# Patient Record
Sex: FEMALE | Race: WHITE | NOT HISPANIC OR LATINO | Employment: OTHER | RURAL
[De-identification: names, ages, dates, MRNs, and addresses within clinical notes are randomized per-mention and may not be internally consistent; named-entity substitution may affect disease eponyms.]

---

## 2022-04-01 ENCOUNTER — TELEPHONE (OUTPATIENT)
Dept: FAMILY MEDICINE CLINIC | Facility: CLINIC | Age: 82
End: 2022-04-01

## 2022-04-18 ENCOUNTER — OFFICE VISIT (OUTPATIENT)
Dept: FAMILY MEDICINE CLINIC | Facility: CLINIC | Age: 82
End: 2022-04-18

## 2022-04-18 VITALS
SYSTOLIC BLOOD PRESSURE: 124 MMHG | HEIGHT: 65 IN | DIASTOLIC BLOOD PRESSURE: 72 MMHG | BODY MASS INDEX: 26.66 KG/M2 | WEIGHT: 160 LBS

## 2022-04-18 DIAGNOSIS — K62.5 RECTAL BLEEDING: Primary | ICD-10-CM

## 2022-04-18 DIAGNOSIS — M54.50 CHRONIC LOW BACK PAIN, UNSPECIFIED BACK PAIN LATERALITY, UNSPECIFIED WHETHER SCIATICA PRESENT: ICD-10-CM

## 2022-04-18 DIAGNOSIS — G89.29 CHRONIC LOW BACK PAIN, UNSPECIFIED BACK PAIN LATERALITY, UNSPECIFIED WHETHER SCIATICA PRESENT: ICD-10-CM

## 2022-04-18 DIAGNOSIS — N39.0 CHRONIC UTI: ICD-10-CM

## 2022-04-18 PROCEDURE — 36415 COLL VENOUS BLD VENIPUNCTURE: CPT | Performed by: STUDENT IN AN ORGANIZED HEALTH CARE EDUCATION/TRAINING PROGRAM

## 2022-04-18 PROCEDURE — 99214 OFFICE O/P EST MOD 30 MIN: CPT | Performed by: STUDENT IN AN ORGANIZED HEALTH CARE EDUCATION/TRAINING PROGRAM

## 2022-04-18 RX ORDER — PRAVASTATIN SODIUM 80 MG/1
80 TABLET ORAL DAILY
COMMUNITY
Start: 2022-03-31 | End: 2022-08-11 | Stop reason: SDUPTHER

## 2022-04-18 RX ORDER — ESCITALOPRAM OXALATE 10 MG/1
10 TABLET ORAL DAILY
COMMUNITY
Start: 2022-03-31 | End: 2022-08-11 | Stop reason: SDUPTHER

## 2022-04-18 RX ORDER — BRIMONIDINE TARTRATE/TIMOLOL 0.2%-0.5%
DROPS OPHTHALMIC (EYE)
COMMUNITY
Start: 2022-03-23

## 2022-04-18 RX ORDER — PIOGLITAZONEHYDROCHLORIDE 30 MG/1
30 TABLET ORAL DAILY
Qty: 90 TABLET | Refills: 1 | Status: SHIPPED | OUTPATIENT
Start: 2022-04-18 | End: 2022-08-11 | Stop reason: SDUPTHER

## 2022-04-18 RX ORDER — GLIMEPIRIDE 2 MG/1
2 TABLET ORAL DAILY
COMMUNITY
End: 2022-07-06

## 2022-04-18 RX ORDER — METOPROLOL SUCCINATE 50 MG/1
50 TABLET, EXTENDED RELEASE ORAL DAILY
COMMUNITY
Start: 2022-03-31 | End: 2022-08-11 | Stop reason: SDUPTHER

## 2022-04-18 RX ORDER — PANTOPRAZOLE SODIUM 40 MG/1
40 TABLET, DELAYED RELEASE ORAL DAILY
COMMUNITY
Start: 2022-03-31 | End: 2022-08-11 | Stop reason: SDUPTHER

## 2022-04-18 RX ORDER — PIOGLITAZONEHYDROCHLORIDE 30 MG/1
30 TABLET ORAL DAILY
COMMUNITY
End: 2022-04-18 | Stop reason: SDUPTHER

## 2022-04-18 RX ORDER — VALSARTAN 160 MG/1
160 TABLET ORAL DAILY
COMMUNITY
Start: 2022-03-31 | End: 2022-08-11 | Stop reason: SDUPTHER

## 2022-04-18 RX ORDER — CEPHALEXIN 250 MG/1
250 CAPSULE ORAL NIGHTLY
COMMUNITY
Start: 2022-04-08 | End: 2022-09-14 | Stop reason: SDUPTHER

## 2022-04-18 RX ORDER — LATANOPROST 50 UG/ML
SOLUTION/ DROPS OPHTHALMIC
COMMUNITY
Start: 2022-03-23

## 2022-04-18 NOTE — ASSESSMENT & PLAN NOTE
Last colonoscopy recommended follow-up in 5 years in 2016.  She is unable to quantify the amount of rectal bleeding.  We will do CBC today to look for acute anemia, and I will send referral to GI.

## 2022-04-18 NOTE — ASSESSMENT & PLAN NOTE
Discussed at length that since she is not symptomatic at this time we do not need to do a UA.  Advised that if she does have any symptoms such as dysuria, urinary frequency or hesitancy or there is blood seen in the urine that we can do a UA and culture.

## 2022-04-18 NOTE — PROGRESS NOTES
"Chief Complaint  Med Refill and Rectal Bleeding    Subjective          Jennyfer Portillo presents to [facility]  History of Present Illness    Rectal bleeding: She states that it has been going on off and on for about a year. She states that it has improved some.  She is unable to quantify the amount of blood that is in her stool. Last Colonoscopy was 2016. She has a hx oif both internal hemorrhoids and sigmoid diverticulosis.     Chronic UTI: Daughter states that she has been on Keflex as maintenance medication daily for several years. She is not having any symptoms of UTI at this time including no Burning, urinary frequency, worsening incontinence, has not had any change in her cognition.    Low back pain: post fall several months ago. She states that she had an Xray which showed some arthritis. She has not been to PT, but she has been recommended to see Pain management, and she declined at that time.     Objective   Vital Signs:   /72 (BP Location: Left arm, Patient Position: Sitting, Cuff Size: Adult)   Ht 165.1 cm (65\")   Wt 72.6 kg (160 lb)   BMI 26.63 kg/m²     Body mass index is 26.63 kg/m².    Review of Systems    Past History:  Medical History: has a past medical history of Benign essential hypertension, Bilateral hearing loss, Carotid artery disease (HCC) (1998), Chronic obstructive lung disease (HCC), Depressive disorder, Diverticulosis (01/14/2016), Glaucoma, High risk medication use, myocardial infarction, Megaloblastic anemia, Migraines, Mixed hyperlipidemia, Obesity, Osteopenia, Pregnancy, Recurrent UTI (urinary tract infection), Type 2 diabetes mellitus (HCC), and Vitamin D deficiency.   Surgical History: has a past surgical history that includes ORIF ankle fracture (Right, 2005); Retinal detachment surgery (06/04/2009); Colonoscopy (11/20/2012); Hysterectomy (1979); and Essure tubal ligation (1968).   Family History: family history includes Colon cancer in her " mother and sister; Coronary artery disease in her mother; Diabetes in her mother; Hyperlipidemia in her mother; Hypertension in her mother; Peripheral vascular disease in her mother.   Social History: reports that she has quit smoking. She has never used smokeless tobacco. Alcohol use questions deferred to the physician. Drug use questions deferred to the physician.      Current Outpatient Medications:   •  cephalexin (KEFLEX) 250 MG capsule, , Disp: , Rfl:   •  Combigan 0.2-0.5 % ophthalmic solution, , Disp: , Rfl:   •  escitalopram (LEXAPRO) 10 MG tablet, , Disp: , Rfl:   •  glimepiride (AMARYL) 2 MG tablet, Take 2 mg by mouth Daily., Disp: , Rfl:   •  latanoprost (XALATAN) 0.005 % ophthalmic solution, , Disp: , Rfl:   •  metFORMIN (GLUCOPHAGE) 500 MG tablet, Take 500 mg by mouth., Disp: , Rfl:   •  metoprolol succinate XL (TOPROL-XL) 50 MG 24 hr tablet, , Disp: , Rfl:   •  pantoprazole (PROTONIX) 40 MG EC tablet, , Disp: , Rfl:   •  pioglitazone (ACTOS) 30 MG tablet, Take 1 tablet by mouth Daily., Disp: 90 tablet, Rfl: 1  •  pravastatin (PRAVACHOL) 80 MG tablet, , Disp: , Rfl:   •  valsartan (DIOVAN) 160 MG tablet, , Disp: , Rfl:     Allergies: Sulfa antibiotics    Physical Exam  Constitutional:       General: She is not in acute distress.     Appearance: Normal appearance. She is not toxic-appearing.   HENT:      Head: Normocephalic and atraumatic.   Cardiovascular:      Rate and Rhythm: Normal rate and regular rhythm.   Pulmonary:      Effort: Pulmonary effort is normal.      Breath sounds: Normal breath sounds. No wheezing.   Abdominal:      General: Abdomen is flat. There is no distension.      Tenderness: There is no abdominal tenderness. There is no right CVA tenderness, left CVA tenderness or guarding.   Skin:     General: Skin is warm and dry.      Coloration: Skin is pale.   Neurological:      Mental Status: She is alert.          Result Review :                   Assessment and Plan    Diagnoses and all  orders for this visit:    1. Rectal bleeding (Primary)  Assessment & Plan:  Last colonoscopy recommended follow-up in 5 years in 2016.  She is unable to quantify the amount of rectal bleeding.  We will do CBC today to look for acute anemia, and I will send referral to GI.     Orders:  -     pioglitazone (ACTOS) 30 MG tablet; Take 1 tablet by mouth Daily.  Dispense: 90 tablet; Refill: 1  -     Ambulatory Referral to Gastroenterology  -     CBC & Differential    2. Chronic UTI  Assessment & Plan:  Discussed at length that since she is not symptomatic at this time we do not need to do a UA.  Advised that if she does have any symptoms such as dysuria, urinary frequency or hesitancy or there is blood seen in the urine that we can do a UA and culture.        3. Chronic low back pain, unspecified back pain laterality, unspecified whether sciatica present  Assessment & Plan:  Offered PT.  Patient declined.  Advised the use of Tylenol, or occasional ibuprofen to help with pain as well as heat and stretching.        Follow Up   No follow-ups on file.  Patient was given instructions and counseling regarding her condition or for health maintenance advice. Please see specific information pulled into the AVS if appropriate.     Christina Abad, DO

## 2022-04-18 NOTE — ASSESSMENT & PLAN NOTE
Offered PT.  Patient declined.  Advised the use of Tylenol, or occasional ibuprofen to help with pain as well as heat and stretching.

## 2022-04-19 LAB
BASOPHILS # BLD AUTO: 0 X10E3/UL (ref 0–0.2)
BASOPHILS NFR BLD AUTO: 1 %
EOSINOPHIL # BLD AUTO: 0.2 X10E3/UL (ref 0–0.4)
EOSINOPHIL NFR BLD AUTO: 3 %
ERYTHROCYTE [DISTWIDTH] IN BLOOD BY AUTOMATED COUNT: 13.3 % (ref 11.7–15.4)
HCT VFR BLD AUTO: 32.2 % (ref 34–46.6)
HGB BLD-MCNC: 10.3 G/DL (ref 11.1–15.9)
IMM GRANULOCYTES # BLD AUTO: 0 X10E3/UL (ref 0–0.1)
IMM GRANULOCYTES NFR BLD AUTO: 0 %
LYMPHOCYTES # BLD AUTO: 0.9 X10E3/UL (ref 0.7–3.1)
LYMPHOCYTES NFR BLD AUTO: 14 %
MCH RBC QN AUTO: 31 PG (ref 26.6–33)
MCHC RBC AUTO-ENTMCNC: 32 G/DL (ref 31.5–35.7)
MCV RBC AUTO: 97 FL (ref 79–97)
MONOCYTES # BLD AUTO: 0.4 X10E3/UL (ref 0.1–0.9)
MONOCYTES NFR BLD AUTO: 7 %
NEUTROPHILS # BLD AUTO: 4.7 X10E3/UL (ref 1.4–7)
NEUTROPHILS NFR BLD AUTO: 75 %
PLATELET # BLD AUTO: 193 X10E3/UL (ref 150–450)
RBC # BLD AUTO: 3.32 X10E6/UL (ref 3.77–5.28)
WBC # BLD AUTO: 6.3 X10E3/UL (ref 3.4–10.8)

## 2022-04-25 PROBLEM — R63.4 WEIGHT LOSS: Status: ACTIVE | Noted: 2020-03-04

## 2022-04-25 PROBLEM — R93.2 ABNORMAL CT SCAN, GALLBLADDER: Status: ACTIVE | Noted: 2020-03-04

## 2022-04-25 PROBLEM — E11.9 DIABETES MELLITUS: Status: ACTIVE | Noted: 2020-03-04

## 2022-04-25 PROBLEM — C24.9: Status: ACTIVE | Noted: 2020-03-04

## 2022-04-25 PROBLEM — R97.0 ELEVATED CEA: Status: ACTIVE | Noted: 2020-03-09

## 2022-04-25 PROBLEM — K80.50 STONES COMMON DUCT: Status: ACTIVE | Noted: 2020-03-04

## 2022-04-25 PROBLEM — R79.89 ELEVATED LFTS: Status: ACTIVE | Noted: 2020-03-04

## 2022-04-25 PROBLEM — R10.11 RIGHT UPPER QUADRANT ABDOMINAL PAIN: Status: ACTIVE | Noted: 2020-03-04

## 2022-04-25 PROBLEM — I10 HYPERTENSION: Status: ACTIVE | Noted: 2020-03-04

## 2022-04-28 ENCOUNTER — OFFICE VISIT (OUTPATIENT)
Dept: FAMILY MEDICINE CLINIC | Facility: CLINIC | Age: 82
End: 2022-04-28

## 2022-04-28 VITALS
RESPIRATION RATE: 18 BRPM | SYSTOLIC BLOOD PRESSURE: 142 MMHG | BODY MASS INDEX: 27.71 KG/M2 | WEIGHT: 166.3 LBS | HEIGHT: 65 IN | HEART RATE: 63 BPM | TEMPERATURE: 97.8 F | OXYGEN SATURATION: 99 % | DIASTOLIC BLOOD PRESSURE: 76 MMHG

## 2022-04-28 DIAGNOSIS — B95.62 CELLULITIS DUE TO MRSA: Primary | ICD-10-CM

## 2022-04-28 DIAGNOSIS — L03.90 CELLULITIS DUE TO MRSA: Primary | ICD-10-CM

## 2022-04-28 PROCEDURE — 99213 OFFICE O/P EST LOW 20 MIN: CPT | Performed by: PHYSICIAN ASSISTANT

## 2022-04-28 RX ORDER — DOXYCYCLINE HYCLATE 100 MG/1
100 CAPSULE ORAL 2 TIMES DAILY
Qty: 14 CAPSULE | Refills: 0 | Status: SHIPPED | OUTPATIENT
Start: 2022-04-28 | End: 2022-05-05

## 2022-04-28 NOTE — PROGRESS NOTES
"Chief Complaint  Cyst (Left arm, near shoulder)    Subjective          Jennyfer Portillo presents to [facility]  History of Present Illness   Patient presents to the clinic with her daughter.  She states that she has had a small red place on her arm for about a month.  She states she told her daughter about it about a week ago.  Daughter states that the spot has looked more red to her and has also had some drainage off and on.  They deny any fever patient states that it is tender.  She is currently on cephalexin daily for recurrent urinary tract infections      Objective   Vital Signs:   /76   Pulse 63   Temp 97.8 °F (36.6 °C)   Resp 18   Ht 165.1 cm (65\")   Wt 75.4 kg (166 lb 4.8 oz)   SpO2 99%   BMI 27.67 kg/m²     Physical Exam  Vitals reviewed.   Constitutional:       Appearance: Normal appearance.   HENT:      Head: Normocephalic.      Right Ear: Tympanic membrane, ear canal and external ear normal.      Left Ear: Tympanic membrane, ear canal and external ear normal.      Nose: Nose normal.      Mouth/Throat:      Mouth: Mucous membranes are moist.   Eyes:      Pupils: Pupils are equal, round, and reactive to light.   Cardiovascular:      Rate and Rhythm: Normal rate and regular rhythm.      Heart sounds: Normal heart sounds.   Pulmonary:      Effort: Pulmonary effort is normal.      Breath sounds: Normal breath sounds.   Skin:         Neurological:      Mental Status: She is alert.        Result Review :                 Assessment and Plan    Diagnoses and all orders for this visit:    1. Cellulitis due to MRSA (Primary)    Made an appointment with general surgery for tomorrow to have pustule lanced.  Will have patient hold her cephalexin and will add doxycycline twice a day for the her MRSA as she is allergic to sulfa  -     doxycycline (VIBRAMYCIN) 100 MG capsule; Take 1 capsule by mouth 2 (Two) Times a Day for 7 days.  Dispense: 14 capsule; Refill: 0  -     Ambulatory " Referral to General Surgery               Follow Up   No follow-ups on file.  Patient was given instructions and counseling regarding her condition or for health maintenance advice. Please see specific information pulled into the AVS if appropriate.

## 2022-05-16 ENCOUNTER — OFFICE VISIT (OUTPATIENT)
Dept: GASTROENTEROLOGY | Facility: CLINIC | Age: 82
End: 2022-05-16

## 2022-05-16 VITALS
TEMPERATURE: 97.1 F | BODY MASS INDEX: 27.26 KG/M2 | DIASTOLIC BLOOD PRESSURE: 68 MMHG | WEIGHT: 163.6 LBS | HEART RATE: 66 BPM | HEIGHT: 65 IN | SYSTOLIC BLOOD PRESSURE: 122 MMHG | OXYGEN SATURATION: 94 %

## 2022-05-16 DIAGNOSIS — R19.5 DARK STOOLS: ICD-10-CM

## 2022-05-16 DIAGNOSIS — D64.9 CHRONIC ANEMIA: Primary | ICD-10-CM

## 2022-05-16 DIAGNOSIS — K21.9 GASTROESOPHAGEAL REFLUX DISEASE, UNSPECIFIED WHETHER ESOPHAGITIS PRESENT: ICD-10-CM

## 2022-05-16 PROBLEM — E55.9 VITAMIN D DEFICIENCY: Status: ACTIVE | Noted: 2022-05-16

## 2022-05-16 PROBLEM — D53.1 MEGALOBLASTIC ANEMIA: Status: ACTIVE | Noted: 2022-05-16

## 2022-05-16 PROBLEM — Z79.899 HIGH RISK MEDICATION USE: Status: ACTIVE | Noted: 2022-05-16

## 2022-05-16 PROBLEM — F32.A DEPRESSIVE DISORDER: Status: ACTIVE | Noted: 2022-05-16

## 2022-05-16 PROBLEM — M85.80 OSTEOPENIA: Status: ACTIVE | Noted: 2022-05-16

## 2022-05-16 PROBLEM — H91.93 BILATERAL HEARING LOSS: Status: ACTIVE | Noted: 2022-05-16

## 2022-05-16 PROBLEM — I25.2 HISTORY OF MYOCARDIAL INFARCTION: Status: ACTIVE | Noted: 2022-05-16

## 2022-05-16 PROCEDURE — 99204 OFFICE O/P NEW MOD 45 MIN: CPT | Performed by: PHYSICIAN ASSISTANT

## 2022-05-16 RX ORDER — AMLODIPINE BESYLATE 5 MG/1
5 TABLET ORAL DAILY
COMMUNITY
Start: 2022-05-03 | End: 2023-02-08

## 2022-05-16 NOTE — PROGRESS NOTES
New Patient Consultation     Patient Name: Jennyfer Portillo  : 1940   MRN: [id number]     Chief Complaint:    Chief Complaint   Patient presents with   • Black or Bloody Stool       History of Present Illness: Jennyfer Portillo is a 82 y.o. female, PMH includes CAD, HTN, vitamin D defiency, chronic UTIs, depression, who is here today for a Gastroenterology Consultation for rectal bleeding.    PMH also significant for hospitalization at [facility] 3/2020 for elevated LFTs, choledocholithiasis. MRCP at that time reveals cholelithiasis and choledocholithiasis with associated inflammation of the gallbladder mucosa and mild pericholecystic fluid. There is mild dilation if intrahepatic biliary tree. Incidental note of pancreas divisum, no nodules or masses. She was noted to have elevated CEA at that time, normal CA 19-9. She did not follow up for CSY with [place] due to COVID-19 pandemic.     CBC  reveals Hb 10.3, Hct 32.2, plt 193. These values are consistent with pt's baseline dating back 2 years.     CSY 2016 with Dr. Amin. Adequate bowel prep conditions. Moderately severe diverticulosis in sigmoid colon. Internal hemorrhoids. Recommend repeat CSY in 5 years.     She notes loose, dark stools daily for nearly two years. She notes occasional bright red blood when wiping. She otherwise denies associated fever, chills, abdominal pain, indigestion, nausea, vomiting, constipation, hematemesis, dysphagia, hematochezia, bloating, weight loss or gain, dysuria, jaundice or bruising.    Patient denies personal or FHx of PUD, H Pylori, gastritis, pancreatitis, colitis, Celiac disease, UC, Crohn's disease, IBS, colon or gastric cancers. Pt denies EtOH, tobacco, illicit substance or NSAID use.    Subjective      Review of Systems:   Review of Systems   Constitutional: Negative for appetite change, chills, diaphoresis, fatigue, fever, unexpected weight gain and unexpected weight loss.   HENT: Negative for drooling,  facial swelling, mouth sores, rhinorrhea, sore throat, tinnitus, trouble swallowing and voice change.    Eyes: Negative.    Respiratory: Negative for cough, chest tightness and shortness of breath.    Cardiovascular: Negative for chest pain.   Gastrointestinal: Positive for anal bleeding. Negative for abdominal pain, blood in stool, constipation, diarrhea, nausea, rectal pain, vomiting, GERD and indigestion.   Genitourinary: Negative for dysuria, flank pain, hematuria and pelvic pain.   Musculoskeletal: Negative for arthralgias and myalgias.   Skin: Negative for color change, pallor and rash.   Neurological: Negative for dizziness, tremors, syncope, weakness and numbness.   Psychiatric/Behavioral: Negative for hallucinations and sleep disturbance. The patient is not nervous/anxious.    All other systems reviewed and are negative.      Past Medical History:   Past Medical History:   Diagnosis Date   • Benign essential hypertension    • Bilateral hearing loss    • Carotid artery disease (HCC) 1998    ANGIOPLASTY, ANOTHER STENT 2001   • Chronic obstructive lung disease (HCC)    • Depressive disorder    • Diverticulosis 01/14/2016    , IN SIGMOID COLON, INTERNAL HEMMORRHOIDS, REPEAT RECOMMENDED 5 YRSOD SEVERE   • Glaucoma    • High risk medication use    • Hx of myocardial infarction    • Megaloblastic anemia    • Migraines    • Mixed hyperlipidemia     DUE TO TYPE 2 DIABETES, W. HYPERGLYCEMIA   • Obesity    • Osteopenia     OF BOTH HIPS/ SPINE   • Pregnancy     1962,1961,1963,1958,1960   • Recurrent UTI (urinary tract infection)    • Type 2 diabetes mellitus (HCC)     STAGE 3 CHRONIC KIDNEY DISEASE, W/O LONG  TERM USE OF CURRENT INSULIN   • Vitamin D deficiency        Past Surgical History:   Past Surgical History:   Procedure Laterality Date   • COLONOSCOPY  11/20/2012    MODERATLEY SEVERE DIVERTICLOSIS FOUND IN THE SIGMOID COLON AND DESCENDING COLON. RECOMMENDED IN 3 YRS   • ESSURE TUBAL LIGATION  1968    BILATERAL    • HYSTERECTOMY     • ORIF ANKLE FRACTURE Right 2005    OPEN REDUCTION INTERNAL FIXATION, SUCESSFUL OUTCOME   • RETINAL DETACHMENT SURGERY  2009    REATTACHED , LEFT       Family History:   Family History   Problem Relation Age of Onset   • Coronary artery disease Mother    • Colon cancer Mother    • Diabetes Mother    • Hyperlipidemia Mother    • Hypertension Mother    • Peripheral vascular disease Mother    • Colon cancer Sister        Social History:   Social History     Socioeconomic History   • Marital status:    Tobacco Use   • Smoking status: Former Smoker     Packs/day: 0.25     Years: 5.00     Pack years: 1.25     Quit date:      Years since quittin.3   • Smokeless tobacco: Never Used   Vaping Use   • Vaping Use: Never used   Substance and Sexual Activity   • Alcohol use: Defer   • Drug use: Defer   • Sexual activity: Defer       Alcohol/Tobacco History:   Social History     Substance and Sexual Activity   Alcohol Use Defer     Social History     Tobacco Use   Smoking Status Former Smoker   • Packs/day: 0.25   • Years: 5.00   • Pack years: 1.25   • Quit date:    • Years since quittin.3   Smokeless Tobacco Never Used       Medications:     Current Outpatient Medications:   •  amLODIPine (NORVASC) 5 MG tablet, , Disp: , Rfl:   •  cephalexin (KEFLEX) 250 MG capsule, , Disp: , Rfl:   •  Combigan 0.2-0.5 % ophthalmic solution, , Disp: , Rfl:   •  escitalopram (LEXAPRO) 10 MG tablet, , Disp: , Rfl:   •  glimepiride (AMARYL) 2 MG tablet, Take 2 mg by mouth Daily., Disp: , Rfl:   •  latanoprost (XALATAN) 0.005 % ophthalmic solution, , Disp: , Rfl:   •  metFORMIN (GLUCOPHAGE) 500 MG tablet, Take 500 mg by mouth., Disp: , Rfl:   •  metoprolol succinate XL (TOPROL-XL) 50 MG 24 hr tablet, , Disp: , Rfl:   •  pantoprazole (PROTONIX) 40 MG EC tablet, , Disp: , Rfl:   •  pioglitazone (ACTOS) 30 MG tablet, Take 1 tablet by mouth Daily., Disp: 90 tablet, Rfl: 1  •  pravastatin (PRAVACHOL)  "80 MG tablet, , Disp: , Rfl:   •  valsartan (DIOVAN) 160 MG tablet, , Disp: , Rfl:     Allergies:   Allergies   Allergen Reactions   • Rosuvastatin Calcium Unknown - Low Severity   • Sulfa Antibiotics Unknown - Low Severity       Objective     Physical Exam:  Vital Signs:   Vitals:    05/16/22 1342   BP: 122/68   BP Location: Left arm   Patient Position: Sitting   Cuff Size: Adult   Pulse: 66   Temp: 97.1 °F (36.2 °C)   TempSrc: Temporal   SpO2: 94%   Weight: 74.2 kg (163 lb 9.6 oz)   Height: 165.1 cm (65\")     Body mass index is 27.22 kg/m².     Physical Exam  Vitals and nursing note reviewed.   Constitutional:       Appearance: Normal appearance. She is normal weight. She is not ill-appearing or diaphoretic.   HENT:      Head: Normocephalic and atraumatic.      Right Ear: External ear normal.      Left Ear: External ear normal.      Nose: Nose normal. No rhinorrhea.      Mouth/Throat:      Mouth: Mucous membranes are moist.      Pharynx: Oropharynx is clear. No posterior oropharyngeal erythema.   Eyes:      General:         Right eye: No discharge.         Left eye: No discharge.      Conjunctiva/sclera: Conjunctivae normal.      Pupils: Pupils are equal, round, and reactive to light.   Neck:      Thyroid: No thyromegaly.   Cardiovascular:      Rate and Rhythm: Normal rate and regular rhythm.      Pulses: Normal pulses.      Heart sounds: Normal heart sounds. No murmur heard.  Pulmonary:      Effort: Pulmonary effort is normal.      Breath sounds: Normal breath sounds. No wheezing.   Abdominal:      General: Abdomen is flat. Bowel sounds are normal. There is no distension.      Tenderness: There is no abdominal tenderness. There is no guarding.   Musculoskeletal:         General: No tenderness. Normal range of motion.      Cervical back: Normal range of motion and neck supple.      Right lower leg: No edema.      Left lower leg: No edema.   Skin:     General: Skin is warm and dry.      Capillary Refill: Capillary " refill takes less than 2 seconds.      Coloration: Skin is pale. Skin is not jaundiced.      Findings: No bruising, erythema or rash.   Neurological:      General: No focal deficit present.      Mental Status: She is oriented to person, place, and time.      Cranial Nerves: No cranial nerve deficit.   Psychiatric:         Mood and Affect: Mood normal.         Thought Content: Thought content normal.         Judgment: Judgment normal.         Assessment / Plan      Assessment/Plan:   There are no diagnoses linked to this encounter.     Chronic anemia  Dark stools  GERD   - continue protonix 40mg daily   - obtain CBC, iron profile, ferritin   - schedule for EGD / CSY   - follow up in clinic after completion of above studies   - call clinic at any time for questions or new / worsened sx    Follow Up:   Return in about 6 weeks (around 6/27/2022).    Plan of care reviewed with the patient at the conclusion of today's visit.  Education was provided regarding diagnosis, management, and any prescribed or recommended OTC medications.  Patient verbalized understanding of and agreement with management plan.     Time Statement:   Discussed plan of care in detail with patient today. Patient verbally understands and agrees. I have spent 45 minutes reviewing available diagnostics, obtaining history, examining the patient, developing a treatment plan, and educating the patient on disease process and plan of care.    Leida Monterroso PA-C   [facility]

## 2022-05-17 DIAGNOSIS — Z12.11 COLON CANCER SCREENING: Primary | ICD-10-CM

## 2022-05-17 RX ORDER — SODIUM, POTASSIUM,MAG SULFATES 17.5-3.13G
1 SOLUTION, RECONSTITUTED, ORAL ORAL TAKE AS DIRECTED
Qty: 354 ML | Refills: 0 | Status: SHIPPED | OUTPATIENT
Start: 2022-05-17 | End: 2022-08-11

## 2022-05-18 ENCOUNTER — OUTSIDE FACILITY SERVICE (OUTPATIENT)
Dept: GASTROENTEROLOGY | Facility: CLINIC | Age: 82
End: 2022-05-18

## 2022-05-18 PROCEDURE — 88305 TISSUE EXAM BY PATHOLOGIST: CPT | Performed by: INTERNAL MEDICINE

## 2022-05-18 PROCEDURE — 45385 COLONOSCOPY W/LESION REMOVAL: CPT | Performed by: INTERNAL MEDICINE

## 2022-05-19 ENCOUNTER — LAB REQUISITION (OUTPATIENT)
Dept: LAB | Facility: HOSPITAL | Age: 82
End: 2022-05-19

## 2022-05-19 DIAGNOSIS — K57.30 DIVERTICULOSIS OF LARGE INTESTINE WITHOUT PERFORATION OR ABSCESS WITHOUT BLEEDING: ICD-10-CM

## 2022-05-19 DIAGNOSIS — K12.0 RECURRENT ORAL APHTHAE: ICD-10-CM

## 2022-05-19 DIAGNOSIS — K64.8 OTHER HEMORRHOIDS: ICD-10-CM

## 2022-05-19 DIAGNOSIS — R19.5 OTHER FECAL ABNORMALITIES: ICD-10-CM

## 2022-05-19 DIAGNOSIS — Z80.0 FAMILY HISTORY OF MALIGNANT NEOPLASM OF DIGESTIVE ORGANS: ICD-10-CM

## 2022-05-19 DIAGNOSIS — K62.5 HEMORRHAGE OF ANUS AND RECTUM: ICD-10-CM

## 2022-05-20 LAB
CYTO UR: NORMAL
LAB AP CASE REPORT: NORMAL
LAB AP CLINICAL INFORMATION: NORMAL
PATH REPORT.FINAL DX SPEC: NORMAL
PATH REPORT.GROSS SPEC: NORMAL

## 2022-06-22 ENCOUNTER — OUTSIDE FACILITY SERVICE (OUTPATIENT)
Dept: GASTROENTEROLOGY | Facility: CLINIC | Age: 82
End: 2022-06-22

## 2022-06-22 PROCEDURE — 88305 TISSUE EXAM BY PATHOLOGIST: CPT | Performed by: INTERNAL MEDICINE

## 2022-06-22 PROCEDURE — 43239 EGD BIOPSY SINGLE/MULTIPLE: CPT | Performed by: INTERNAL MEDICINE

## 2022-06-23 ENCOUNTER — LAB REQUISITION (OUTPATIENT)
Dept: LAB | Facility: HOSPITAL | Age: 82
End: 2022-06-23

## 2022-06-23 DIAGNOSIS — K44.9 DIAPHRAGMATIC HERNIA WITHOUT OBSTRUCTION OR GANGRENE: ICD-10-CM

## 2022-06-23 DIAGNOSIS — K29.70 GASTRITIS, UNSPECIFIED, WITHOUT BLEEDING: ICD-10-CM

## 2022-06-23 DIAGNOSIS — D64.9 ANEMIA, UNSPECIFIED: ICD-10-CM

## 2022-06-23 DIAGNOSIS — K21.00 GASTRO-ESOPHAGEAL REFLUX DISEASE WITH ESOPHAGITIS, WITHOUT BLEEDING: ICD-10-CM

## 2022-07-06 RX ORDER — GLIMEPIRIDE 2 MG/1
TABLET ORAL
Qty: 270 TABLET | Refills: 0 | Status: SHIPPED | OUTPATIENT
Start: 2022-07-06 | End: 2022-08-11 | Stop reason: SDUPTHER

## 2022-07-11 ENCOUNTER — OFFICE VISIT (OUTPATIENT)
Dept: GASTROENTEROLOGY | Facility: CLINIC | Age: 82
End: 2022-07-11

## 2022-07-11 ENCOUNTER — LAB (OUTPATIENT)
Dept: FAMILY MEDICINE CLINIC | Facility: CLINIC | Age: 82
End: 2022-07-11

## 2022-07-11 VITALS
HEART RATE: 75 BPM | BODY MASS INDEX: 27.16 KG/M2 | WEIGHT: 163 LBS | SYSTOLIC BLOOD PRESSURE: 132 MMHG | DIASTOLIC BLOOD PRESSURE: 68 MMHG | OXYGEN SATURATION: 97 % | TEMPERATURE: 97.4 F | HEIGHT: 65 IN

## 2022-07-11 DIAGNOSIS — D64.9 CHRONIC ANEMIA: Primary | ICD-10-CM

## 2022-07-11 DIAGNOSIS — K21.9 GASTROESOPHAGEAL REFLUX DISEASE, UNSPECIFIED WHETHER ESOPHAGITIS PRESENT: ICD-10-CM

## 2022-07-11 DIAGNOSIS — D64.9 CHRONIC ANEMIA: ICD-10-CM

## 2022-07-11 DIAGNOSIS — D12.6 TUBULAR ADENOMA OF COLON: ICD-10-CM

## 2022-07-11 DIAGNOSIS — K57.90 DIVERTICULOSIS: ICD-10-CM

## 2022-07-11 PROCEDURE — 99214 OFFICE O/P EST MOD 30 MIN: CPT | Performed by: PHYSICIAN ASSISTANT

## 2022-07-11 PROCEDURE — 36415 COLL VENOUS BLD VENIPUNCTURE: CPT | Performed by: PHYSICIAN ASSISTANT

## 2022-07-11 NOTE — PROGRESS NOTES
"     Follow Up      Patient Name: Jennyfer Portillo  : 1940   MRN: [id number]     Chief Complaint:  No chief complaint on file.      History of Present Illness: Jennyefr Portillo is a 82 y.o. female who is here today for post procedure follow up. Daughter is with her for visit today.     EGD  with Dr. Johnson. Normal upper and middle third of esophagus. LA Grade A esophagitis. Small sliding HH. Mild gastritis in body and antrum of stomach. Normal duodenum.     CSY  with Dr. Johnson. Good colon prep conditions. 4mm polyp (sessile serrated adenoma) in cecum, resected and retrieved. Diverticulosis in sigmoid colon. Nonbleeding internal hemorrhoids    She did not obtain labs since last visit. She is taking pantoprazole 40mg daily, denies iron supplementation. She states that she \"feels good\" and denies associated fever, chills, abdominal pain, indigestion, nausea, vomiting, diarrhea, constipation, hematemesis, dysphagia, hematochezia, melena, bloating, weight loss or gain, dysuria, jaundice or bruising.    Subjective      Review of Systems:   Review of Systems   Constitutional: Negative for appetite change, chills, diaphoresis, fatigue, fever, unexpected weight gain and unexpected weight loss.   HENT: Negative for drooling, facial swelling, mouth sores, rhinorrhea, sore throat, tinnitus, trouble swallowing and voice change.    Eyes: Negative.    Respiratory: Negative for cough, chest tightness and shortness of breath.    Cardiovascular: Negative for chest pain.   Gastrointestinal: Negative for abdominal pain, blood in stool, constipation, diarrhea, nausea, vomiting, GERD and indigestion.   Genitourinary: Negative for dysuria, flank pain, hematuria and pelvic pain.   Musculoskeletal: Negative for arthralgias and myalgias.   Skin: Negative for color change, pallor and rash.   Neurological: Negative for dizziness, tremors, syncope, weakness and numbness.   Psychiatric/Behavioral: Negative for hallucinations and " sleep disturbance. The patient is not nervous/anxious.    All other systems reviewed and are negative.      Medications:     Current Outpatient Medications:   •  amLODIPine (NORVASC) 5 MG tablet, , Disp: , Rfl:   •  cephalexin (KEFLEX) 250 MG capsule, , Disp: , Rfl:   •  Combigan 0.2-0.5 % ophthalmic solution, , Disp: , Rfl:   •  escitalopram (LEXAPRO) 10 MG tablet, , Disp: , Rfl:   •  glimepiride (AMARYL) 2 MG tablet, TAKE 1-3 TABLETS BY MOUTH EVERY DAY, Disp: 270 tablet, Rfl: 0  •  latanoprost (XALATAN) 0.005 % ophthalmic solution, , Disp: , Rfl:   •  metFORMIN (GLUCOPHAGE) 500 MG tablet, TAKE 2 TABLETS BY MOUTH TWICE DAILY, Disp: 360 tablet, Rfl: 0  •  metoprolol succinate XL (TOPROL-XL) 50 MG 24 hr tablet, , Disp: , Rfl:   •  pantoprazole (PROTONIX) 40 MG EC tablet, , Disp: , Rfl:   •  pioglitazone (ACTOS) 30 MG tablet, Take 1 tablet by mouth Daily., Disp: 90 tablet, Rfl: 1  •  pravastatin (PRAVACHOL) 80 MG tablet, , Disp: , Rfl:   •  sodium-potassium-magnesium sulfates (Suprep Bowel Prep Kit) 17.5-3.13-1.6 GM/177ML solution oral solution, Take 1 bottle by mouth Take As Directed. Follow instructions that were mailed to your home. If you didn't receive these call [phone]., Disp: 354 mL, Rfl: 0  •  valsartan (DIOVAN) 160 MG tablet, , Disp: , Rfl:     Allergies:   Allergies   Allergen Reactions   • Rosuvastatin Calcium Unknown - Low Severity   • Sulfa Antibiotics Unknown - Low Severity       Social History:   Social History     Socioeconomic History   • Marital status:    Tobacco Use   • Smoking status: Former Smoker     Packs/day: 0.25     Years: 5.00     Pack years: 1.25     Quit date:      Years since quittin.5   • Smokeless tobacco: Never Used   Vaping Use   • Vaping Use: Never used   Substance and Sexual Activity   • Alcohol use: Defer   • Drug use: Defer   • Sexual activity: Defer        Surgical History:   Past Surgical History:   Procedure Laterality Date   • COLONOSCOPY  2012     MODERATLEY SEVERE DIVERTICLOSIS FOUND IN THE SIGMOID COLON AND DESCENDING COLON. RECOMMENDED IN 3 YRS   • ESSURE TUBAL LIGATION  1968    BILATERAL   • HYSTERECTOMY  1979   • ORIF ANKLE FRACTURE Right 2005    OPEN REDUCTION INTERNAL FIXATION, SUCESSFUL OUTCOME   • RETINAL DETACHMENT SURGERY  06/04/2009    REATTACHED , LEFT        Medical History:   Past Medical History:   Diagnosis Date   • Benign essential hypertension    • Bilateral hearing loss    • Carotid artery disease (HCC) 1998    ANGIOPLASTY, ANOTHER STENT 2001   • Chronic obstructive lung disease (HCC)    • Depressive disorder    • Diverticulosis 01/14/2016    , IN SIGMOID COLON, INTERNAL HEMMORRHOIDS, REPEAT RECOMMENDED 5 YRSOD SEVERE   • Glaucoma    • High risk medication use    • Hx of myocardial infarction    • Megaloblastic anemia    • Migraines    • Mixed hyperlipidemia     DUE TO TYPE 2 DIABETES, W. HYPERGLYCEMIA   • Obesity    • Osteopenia     OF BOTH HIPS/ SPINE   • Pregnancy     1962,1961,1963,1958,1960   • Recurrent UTI (urinary tract infection)    • Type 2 diabetes mellitus (HCC)     STAGE 3 CHRONIC KIDNEY DISEASE, W/O LONG  TERM USE OF CURRENT INSULIN   • Vitamin D deficiency         Objective     Physical Exam:  Vital Signs: There were no vitals filed for this visit.  There is no height or weight on file to calculate BMI.     Physical Exam  Vitals and nursing note reviewed.   Constitutional:       Appearance: Normal appearance. She is normal weight. She is not ill-appearing or diaphoretic.   HENT:      Head: Normocephalic and atraumatic.      Right Ear: External ear normal.      Left Ear: External ear normal.      Nose: Nose normal. No rhinorrhea.      Mouth/Throat:      Mouth: Mucous membranes are moist.      Pharynx: Oropharynx is clear. No posterior oropharyngeal erythema.   Eyes:      General:         Right eye: No discharge.         Left eye: No discharge.      Conjunctiva/sclera: Conjunctivae normal.      Pupils: Pupils are equal,  round, and reactive to light.   Neck:      Thyroid: No thyromegaly.   Cardiovascular:      Rate and Rhythm: Normal rate and regular rhythm.      Pulses: Normal pulses.      Heart sounds: Normal heart sounds. No murmur heard.  Pulmonary:      Effort: Pulmonary effort is normal.      Breath sounds: Normal breath sounds. No wheezing.   Abdominal:      General: Abdomen is flat. Bowel sounds are normal. There is no distension.      Tenderness: There is no abdominal tenderness.   Musculoskeletal:         General: No tenderness. Normal range of motion.      Cervical back: Normal range of motion and neck supple.      Right lower leg: No edema.      Left lower leg: No edema.   Skin:     General: Skin is warm and dry.      Capillary Refill: Capillary refill takes less than 2 seconds.      Coloration: Skin is pale. Skin is not jaundiced.      Findings: No bruising.   Neurological:      General: No focal deficit present.      Mental Status: She is oriented to person, place, and time.      Cranial Nerves: No cranial nerve deficit.   Psychiatric:         Mood and Affect: Mood normal.         Thought Content: Thought content normal.         Judgment: Judgment normal.         Assessment / Plan      Assessment/Plan:   There are no diagnoses linked to this encounter.     Chronic anemia  GERD with LA Grade A esophagitis  Diverticulosis  Tubular adenoma colon   - continue pantoprazole 40mg daily   - pt given GERd diet instructions, advised to avoid GI irritants such as caffeine, carbonation, EtOH, tobacco, chocolate, peppermint, acid-based foods   - obtain CBC, iron profile, ferritin   - previous endoscopy and pathology reports reviewed   - schedule for EGD with [facility] if labs reveal persistent PRECIOUS   - follow up in clinic after completion of above studies   - call clinic at any time for questions or new / worsened sx    Follow Up:   No follow-ups on file.    Plan of care reviewed with the patient at the conclusion of today's visit.   Education was provided regarding diagnosis, management, and any prescribed or recommended OTC medications.  Patient verbalized understanding of and agreement with management plan.     Time Statement:   Discussed plan of care in detail with patient today. Patient verbally understands and agrees. I have spent 30 minutes reviewing available diagnostics, obtaining history, examining the patient, developing a treatment plan, and educating the patient on disease process and plan of care.     Leida Monterroso PA-C   [facility] – [facility]

## 2022-07-12 LAB
BASOPHILS # BLD AUTO: 0 X10E3/UL (ref 0–0.2)
BASOPHILS NFR BLD AUTO: 1 %
EOSINOPHIL # BLD AUTO: 0.2 X10E3/UL (ref 0–0.4)
EOSINOPHIL NFR BLD AUTO: 4 %
ERYTHROCYTE [DISTWIDTH] IN BLOOD BY AUTOMATED COUNT: 13 % (ref 11.7–15.4)
HCT VFR BLD AUTO: 32.4 % (ref 34–46.6)
HGB BLD-MCNC: 10.7 G/DL (ref 11.1–15.9)
IMM GRANULOCYTES # BLD AUTO: 0 X10E3/UL (ref 0–0.1)
IMM GRANULOCYTES NFR BLD AUTO: 0 %
IRON SATN MFR SERPL: 26 % (ref 15–55)
IRON SERPL-MCNC: 74 UG/DL (ref 27–139)
LYMPHOCYTES # BLD AUTO: 1 X10E3/UL (ref 0.7–3.1)
LYMPHOCYTES NFR BLD AUTO: 26 %
MCH RBC QN AUTO: 32.2 PG (ref 26.6–33)
MCHC RBC AUTO-ENTMCNC: 33 G/DL (ref 31.5–35.7)
MCV RBC AUTO: 98 FL (ref 79–97)
MONOCYTES # BLD AUTO: 0.3 X10E3/UL (ref 0.1–0.9)
MONOCYTES NFR BLD AUTO: 8 %
NEUTROPHILS # BLD AUTO: 2.4 X10E3/UL (ref 1.4–7)
NEUTROPHILS NFR BLD AUTO: 61 %
PLATELET # BLD AUTO: 176 X10E3/UL (ref 150–450)
RBC # BLD AUTO: 3.32 X10E6/UL (ref 3.77–5.28)
TIBC SERPL-MCNC: 289 UG/DL (ref 250–450)
UIBC SERPL-MCNC: 215 UG/DL (ref 118–369)
WBC # BLD AUTO: 3.9 X10E3/UL (ref 3.4–10.8)

## 2022-07-12 NOTE — PROGRESS NOTES
Please let Jennyfer know that her Hb is stable, 10.7 and iron studies are normal. At this time, continue current medications and trending H/H, iron profile via PCP. We can proceed with EGD + [facility] if she has a decline in these studies. Thanks!

## 2022-08-01 ENCOUNTER — TELEPHONE (OUTPATIENT)
Dept: FAMILY MEDICINE CLINIC | Facility: CLINIC | Age: 82
End: 2022-08-01

## 2022-08-01 NOTE — TELEPHONE ENCOUNTER
Caller: DHRUV WILSNO    Relationship to patient: Emergency Contact    Best call back number: [phone]    Patient is needing: HOSPITAL FOLLOW UP APPOINTMENT WITH EITHER DR HORTON OR DR PRINCE. DHRUV DOES NOT WANT PATIENT TO SEE DR GRAY. PATIENT WAS AT [facility] ON 07/28/2022. PATIENT HAS A COMPRESSION FRACTURE OF THE SPINE. PLEASE ADVISE AND CALL DHRUV BACK

## 2022-08-11 ENCOUNTER — OFFICE VISIT (OUTPATIENT)
Dept: FAMILY MEDICINE CLINIC | Facility: CLINIC | Age: 82
End: 2022-08-11

## 2022-08-11 VITALS
OXYGEN SATURATION: 95 % | RESPIRATION RATE: 20 BRPM | SYSTOLIC BLOOD PRESSURE: 134 MMHG | BODY MASS INDEX: 26.77 KG/M2 | HEART RATE: 78 BPM | WEIGHT: 160.7 LBS | HEIGHT: 65 IN | DIASTOLIC BLOOD PRESSURE: 90 MMHG

## 2022-08-11 DIAGNOSIS — K62.5 RECTAL BLEEDING: ICD-10-CM

## 2022-08-11 DIAGNOSIS — I10 BENIGN ESSENTIAL HYPERTENSION: ICD-10-CM

## 2022-08-11 DIAGNOSIS — R53.83 FATIGUE, UNSPECIFIED TYPE: ICD-10-CM

## 2022-08-11 DIAGNOSIS — R41.3 MEMORY LOSS: ICD-10-CM

## 2022-08-11 DIAGNOSIS — N18.32 TYPE 2 DIABETES MELLITUS WITH STAGE 3B CHRONIC KIDNEY DISEASE, WITHOUT LONG-TERM CURRENT USE OF INSULIN: ICD-10-CM

## 2022-08-11 DIAGNOSIS — E11.22 TYPE 2 DIABETES MELLITUS WITH STAGE 3B CHRONIC KIDNEY DISEASE, WITHOUT LONG-TERM CURRENT USE OF INSULIN: ICD-10-CM

## 2022-08-11 DIAGNOSIS — E78.2 MIXED HYPERLIPIDEMIA: ICD-10-CM

## 2022-08-11 DIAGNOSIS — S22.080D COMPRESSION FRACTURE OF T12 VERTEBRA WITH ROUTINE HEALING, SUBSEQUENT ENCOUNTER: Primary | ICD-10-CM

## 2022-08-11 DIAGNOSIS — K21.9 GASTROESOPHAGEAL REFLUX DISEASE WITHOUT ESOPHAGITIS: ICD-10-CM

## 2022-08-11 DIAGNOSIS — Z79.899 ENCOUNTER FOR LONG-TERM (CURRENT) USE OF OTHER MEDICATIONS: ICD-10-CM

## 2022-08-11 PROCEDURE — 99214 OFFICE O/P EST MOD 30 MIN: CPT | Performed by: FAMILY MEDICINE

## 2022-08-11 PROCEDURE — 36415 COLL VENOUS BLD VENIPUNCTURE: CPT | Performed by: FAMILY MEDICINE

## 2022-08-11 RX ORDER — VALSARTAN 160 MG/1
160 TABLET ORAL DAILY
Qty: 90 TABLET | Refills: 1 | Status: SHIPPED | OUTPATIENT
Start: 2022-08-11

## 2022-08-11 RX ORDER — GLIMEPIRIDE 2 MG/1
TABLET ORAL
Qty: 270 TABLET | Refills: 1 | Status: SHIPPED | OUTPATIENT
Start: 2022-08-11

## 2022-08-11 RX ORDER — PIOGLITAZONEHYDROCHLORIDE 30 MG/1
30 TABLET ORAL DAILY
Qty: 90 TABLET | Refills: 1 | Status: SHIPPED | OUTPATIENT
Start: 2022-08-11

## 2022-08-11 RX ORDER — PANTOPRAZOLE SODIUM 40 MG/1
40 TABLET, DELAYED RELEASE ORAL DAILY
Qty: 90 TABLET | Refills: 3 | Status: SHIPPED | OUTPATIENT
Start: 2022-08-11

## 2022-08-11 RX ORDER — PRAVASTATIN SODIUM 80 MG/1
80 TABLET ORAL DAILY
Qty: 90 TABLET | Refills: 3 | Status: SHIPPED | OUTPATIENT
Start: 2022-08-11

## 2022-08-11 RX ORDER — ESCITALOPRAM OXALATE 10 MG/1
10 TABLET ORAL DAILY
Qty: 90 TABLET | Refills: 3 | Status: SHIPPED | OUTPATIENT
Start: 2022-08-11

## 2022-08-11 RX ORDER — METOPROLOL SUCCINATE 50 MG/1
50 TABLET, EXTENDED RELEASE ORAL DAILY
Qty: 90 TABLET | Refills: 3 | Status: SHIPPED | OUTPATIENT
Start: 2022-08-11

## 2022-08-11 RX ORDER — LANOLIN ALCOHOL/MO/W.PET/CERES
1000 CREAM (GRAM) TOPICAL DAILY
Qty: 90 TABLET | Refills: 3 | Status: SHIPPED | OUTPATIENT
Start: 2022-08-11 | End: 2022-10-18

## 2022-08-11 RX ORDER — ASPIRIN 81 MG/1
81 TABLET, DELAYED RELEASE ORAL DAILY
COMMUNITY

## 2022-08-11 RX ORDER — LANOLIN ALCOHOL/MO/W.PET/CERES
1000 CREAM (GRAM) TOPICAL DAILY
COMMUNITY
End: 2022-08-11 | Stop reason: SDUPTHER

## 2022-08-12 DIAGNOSIS — D64.9 ANEMIA, UNSPECIFIED TYPE: Primary | ICD-10-CM

## 2022-08-12 LAB
ALBUMIN SERPL-MCNC: 4 G/DL (ref 3.6–4.6)
ALBUMIN/GLOB SERPL: 1.8 {RATIO} (ref 1.2–2.2)
ALP SERPL-CCNC: 131 IU/L (ref 44–121)
ALT SERPL-CCNC: 6 IU/L (ref 0–32)
AST SERPL-CCNC: 15 IU/L (ref 0–40)
BASOPHILS # BLD AUTO: 0 X10E3/UL (ref 0–0.2)
BASOPHILS NFR BLD AUTO: 1 %
BILIRUB SERPL-MCNC: 0.2 MG/DL (ref 0–1.2)
BUN SERPL-MCNC: 19 MG/DL (ref 8–27)
BUN/CREAT SERPL: 15 (ref 12–28)
CALCIUM SERPL-MCNC: 9.5 MG/DL (ref 8.7–10.3)
CHLORIDE SERPL-SCNC: 102 MMOL/L (ref 96–106)
CHOLEST SERPL-MCNC: 150 MG/DL (ref 100–199)
CO2 SERPL-SCNC: 23 MMOL/L (ref 20–29)
CREAT SERPL-MCNC: 1.28 MG/DL (ref 0.57–1)
EGFRCR SERPLBLD CKD-EPI 2021: 42 ML/MIN/1.73
EOSINOPHIL # BLD AUTO: 0.2 X10E3/UL (ref 0–0.4)
EOSINOPHIL NFR BLD AUTO: 2 %
ERYTHROCYTE [DISTWIDTH] IN BLOOD BY AUTOMATED COUNT: 12.8 % (ref 11.7–15.4)
FOLATE SERPL-MCNC: 7.6 NG/ML
GLOBULIN SER CALC-MCNC: 2.2 G/DL (ref 1.5–4.5)
GLUCOSE SERPL-MCNC: 218 MG/DL (ref 65–99)
HBA1C MFR BLD: 7.6 % (ref 4.8–5.6)
HCT VFR BLD AUTO: 32.1 % (ref 34–46.6)
HDLC SERPL-MCNC: 37 MG/DL
HGB BLD-MCNC: 10.3 G/DL (ref 11.1–15.9)
IMM GRANULOCYTES # BLD AUTO: 0 X10E3/UL (ref 0–0.1)
IMM GRANULOCYTES NFR BLD AUTO: 0 %
LDLC SERPL CALC-MCNC: 89 MG/DL (ref 0–99)
LYMPHOCYTES # BLD AUTO: 1 X10E3/UL (ref 0.7–3.1)
LYMPHOCYTES NFR BLD AUTO: 12 %
MCH RBC QN AUTO: 31.8 PG (ref 26.6–33)
MCHC RBC AUTO-ENTMCNC: 32.1 G/DL (ref 31.5–35.7)
MCV RBC AUTO: 99 FL (ref 79–97)
MONOCYTES # BLD AUTO: 0.5 X10E3/UL (ref 0.1–0.9)
MONOCYTES NFR BLD AUTO: 5 %
NEUTROPHILS # BLD AUTO: 6.6 X10E3/UL (ref 1.4–7)
NEUTROPHILS NFR BLD AUTO: 80 %
PLATELET # BLD AUTO: 287 X10E3/UL (ref 150–450)
POTASSIUM SERPL-SCNC: 4.9 MMOL/L (ref 3.5–5.2)
PROT SERPL-MCNC: 6.2 G/DL (ref 6–8.5)
RBC # BLD AUTO: 3.24 X10E6/UL (ref 3.77–5.28)
SODIUM SERPL-SCNC: 139 MMOL/L (ref 134–144)
TRIGL SERPL-MCNC: 137 MG/DL (ref 0–149)
TSH SERPL DL<=0.005 MIU/L-ACNC: 2.05 UIU/ML (ref 0.45–4.5)
VIT B12 SERPL-MCNC: >2000 PG/ML (ref 232–1245)
VLDLC SERPL CALC-MCNC: 24 MG/DL (ref 5–40)
WBC # BLD AUTO: 8.3 X10E3/UL (ref 3.4–10.8)

## 2022-08-13 PROBLEM — E78.2 MIXED HYPERLIPIDEMIA: Status: ACTIVE | Noted: 2022-08-13

## 2022-08-13 PROBLEM — K21.9 GASTROESOPHAGEAL REFLUX DISEASE WITHOUT ESOPHAGITIS: Status: ACTIVE | Noted: 2022-08-13

## 2022-08-13 PROBLEM — S22.080A COMPRESSION FRACTURE OF T12 VERTEBRA: Status: ACTIVE | Noted: 2022-08-13

## 2022-08-14 NOTE — PROGRESS NOTES
"Chief Complaint  Follow-up and Fall    Subjective      Jennyfer Portillo presents to [facility]  History of Present Illness  Patient recently suffered a fall and developed severe mid back pain.  She went to the emergency department and was found to have an anterior compression fracture of T12, about 20% anterior wedging which was new and acute.  This all happened at near the end of July.  She continues to have a fair amount of back pain, but is currently adequately controlled with Tylenol.  The ER physician spoke with neurosurgery and they did not believe that she needed any bracing or splinting.  She is able to ambulate without assistance.  However, if she moves around a lot or is up on her feet very long the pain does get worse.    She says her hypertension has been well controlled when she checks it at home.  It was a bit higher today because she just walked back and her back was hurting.  Her blood sugars are being checked daily, and she says they are usually fairly well controlled.  She does have occasional hypoglycemia due to decreased appetite since the back injury.    Objective   Vital Signs:   Vitals:    08/11/22 1344   BP: 134/90   Pulse: 78   Resp: 20   SpO2: 95%   Weight: 72.9 kg (160 lb 11.2 oz)   Height: 165.1 cm (65\")      /90   Pulse 78   Resp 20   Ht 165.1 cm (65\")   Wt 72.9 kg (160 lb 11.2 oz)   SpO2 95%   BMI 26.74 kg/m²     Body mass index is 26.74 kg/m².    Review of Systems   Constitutional: Positive for appetite change. Negative for chills, fever and unexpected weight loss.   HENT: Negative for ear discharge, ear pain, mouth sores, nosebleeds, rhinorrhea, sinus pressure, sore throat, swollen glands and trouble swallowing.    Eyes: Negative for blurred vision, double vision, pain, redness and visual disturbance.   Respiratory: Negative for cough, shortness of breath and wheezing.    Cardiovascular: Negative for chest pain, palpitations and leg swelling.     "    PND, orthopnea   Gastrointestinal: Negative for abdominal distention, abdominal pain, anal bleeding, blood in stool, constipation, diarrhea, nausea, vomiting and GERD.        Dysphagia, odynophagia   Endocrine: Negative for polydipsia, polyphagia and polyuria.   Genitourinary: Negative for dysuria, hematuria, urgency and urinary incontinence.   Musculoskeletal: Positive for back pain. Negative for arthralgias (unusual/atypica), gait problem, joint swelling, myalgias and neck pain.   Skin: Negative for rash, skin lesions (worrisome/suspicious) and bruise.   Allergic/Immunologic: Negative for food allergies.   Neurological: Positive for memory problem (Chronic mild). Negative for dizziness, tremors, seizures, syncope, weakness, light-headedness, numbness and headache.   Hematological: Negative for adenopathy. Does not bruise/bleed easily.   Psychiatric/Behavioral: Negative for suicidal ideas and depressed mood. The patient is not nervous/anxious.        Past History:  Medical History: has a past medical history of Benign essential hypertension, Bilateral hearing loss, Carotid artery disease (HCC) (1998), Chronic obstructive lung disease ([facility]), Colon polyp, Depressive disorder, Diverticulosis (01/14/2016), GERD (gastroesophageal reflux disease), Glaucoma, High risk medication use, myocardial infarction, Megaloblastic anemia, Migraines, Mixed hyperlipidemia, Obesity, Osteopenia, Pregnancy, Recurrent UTI (urinary tract infection), Type 2 diabetes mellitus ([facility]), and Vitamin D deficiency.   Surgical History: has a past surgical history that includes ORIF ankle fracture (Right, 2005); Retinal detachment surgery (06/04/2009); Colonoscopy (11/20/2012); Hysterectomy (1979); Essure tubal ligation (1968); and Upper gastrointestinal endoscopy.   Family History: family history includes Colon cancer in her mother and sister; Colon polyps in her mother and sister; Coronary artery disease in her mother; Diabetes in her mother;  Hyperlipidemia in her mother; Hypertension in her mother; Peripheral vascular disease in her mother.   Social History: reports that she quit smoking about 28 years ago. She has a 1.25 pack-year smoking history. She has never used smokeless tobacco. Alcohol use questions deferred to the physician. Drug use questions deferred to the physician.      Current Outpatient Medications:   •  amLODIPine (NORVASC) 5 MG tablet, Take 5 mg by mouth Daily., Disp: , Rfl:   •  Apoaequorin (PREVAGEN PO), Take  by mouth., Disp: , Rfl:   •  aspirin 81 MG EC tablet, Take 81 mg by mouth Daily., Disp: , Rfl:   •  cephalexin (KEFLEX) 250 MG capsule, Take 250 mg by mouth Every Night., Disp: , Rfl:   •  Combigan 0.2-0.5 % ophthalmic solution, , Disp: , Rfl:   •  escitalopram (LEXAPRO) 10 MG tablet, Take 1 tablet by mouth Daily., Disp: 90 tablet, Rfl: 3  •  glimepiride (AMARYL) 2 MG tablet, Take 1 to 3 tablets po qd for diabetes, Disp: 270 tablet, Rfl: 1  •  latanoprost (XALATAN) 0.005 % ophthalmic solution, , Disp: , Rfl:   •  metFORMIN (GLUCOPHAGE) 500 MG tablet, Take 1 tablet by mouth Daily With Breakfast., Disp: 90 tablet, Rfl: 1  •  metoprolol succinate XL (TOPROL-XL) 50 MG 24 hr tablet, Take 1 tablet by mouth Daily., Disp: 90 tablet, Rfl: 3  •  pantoprazole (PROTONIX) 40 MG EC tablet, Take 1 tablet by mouth Daily., Disp: 90 tablet, Rfl: 3  •  pioglitazone (ACTOS) 30 MG tablet, Take 1 tablet by mouth Daily., Disp: 90 tablet, Rfl: 1  •  pravastatin (PRAVACHOL) 80 MG tablet, Take 1 tablet by mouth Daily., Disp: 90 tablet, Rfl: 3  •  valsartan (DIOVAN) 160 MG tablet, Take 1 tablet by mouth Daily., Disp: 90 tablet, Rfl: 1  •  vitamin B-12 (CYANOCOBALAMIN) 1000 MCG tablet, Take 1 tablet by mouth Daily., Disp: 90 tablet, Rfl: 3    Allergies: Rosuvastatin calcium and Sulfa antibiotics    Physical Exam  Constitutional:       General: She is not in acute distress.     Appearance: She is not toxic-appearing.   HENT:      Head: Normocephalic and  atraumatic.      Right Ear: Ear canal and external ear normal.      Left Ear: Ear canal and external ear normal.      Nose: Nose normal.      Mouth/Throat:      Mouth: Mucous membranes are moist.      Pharynx: Oropharynx is clear.   Eyes:      General: No scleral icterus.     Extraocular Movements: Extraocular movements intact.      Conjunctiva/sclera: Conjunctivae normal.      Pupils: Pupils are equal, round, and reactive to light.   Neck:      Vascular: No carotid bruit.   Cardiovascular:      Rate and Rhythm: Normal rate and regular rhythm.      Pulses: Normal pulses.      Heart sounds: Normal heart sounds.   Pulmonary:      Effort: Pulmonary effort is normal.      Breath sounds: Normal breath sounds.   Chest:      Chest wall: No tenderness.   Abdominal:      General: Bowel sounds are normal. There is no distension.      Palpations: Abdomen is soft.      Tenderness: There is no abdominal tenderness. There is no guarding or rebound.   Musculoskeletal:         General: Tenderness (Mid back, mostly in the paraspinous muscles on the left) present. No swelling or deformity. Normal range of motion.      Cervical back: Normal range of motion. No rigidity.      Right lower leg: No edema.      Left lower leg: No edema.   Lymphadenopathy:      Cervical: No cervical adenopathy.   Skin:     General: Skin is warm and dry.      Capillary Refill: Capillary refill takes less than 2 seconds.      Coloration: Skin is not pale.      Findings: No erythema or rash.   Neurological:      General: No focal deficit present.      Mental Status: She is alert and oriented to person, place, and time.      Cranial Nerves: No cranial nerve deficit.      Motor: No weakness.      Coordination: Coordination normal.      Gait: Gait normal.   Psychiatric:         Mood and Affect: Mood normal.         Behavior: Behavior normal.         Judgment: Judgment normal.          Result Review :                 Assessment and Plan   Diagnoses and all orders  for this visit:    1. Compression fracture of T12 vertebra with routine healing, subsequent encounter (Primary)  Patient appears to be healing well.  I told them that there is not really any reason to do any follow-up imaging, as he is not having any radicular symptoms or neurologic symptoms that are new.  She will continue current medications and we will check labs.  Patient may have to reduce her intake of glimepiride as long as her appetite remains low in order to prevent hypoglycemia.  I will plan on seeing her back in 6 months or sooner if needed  2. Encounter for long-term (current) use of other medications  -     CBC Auto Differential; Future  -     Comprehensive Metabolic Panel; Future  -     CBC Auto Differential  -     Comprehensive Metabolic Panel    3. Type 2 diabetes mellitus with stage 3b chronic kidney disease, without long-term current use of insulin (HCC)  -     Hemoglobin A1c; Future  -     Hemoglobin A1c    4. Mixed hyperlipidemia  -     Lipid Panel; Future  -     Lipid Panel    5. Memory loss  -     TSH; Future  -     Vitamin B12; Future  -     Folate; Future  -     TSH  -     Vitamin B12  -     Folate    6. Fatigue, unspecified type  -     TSH; Future  -     TSH    7. Rectal bleeding  -     pioglitazone (ACTOS) 30 MG tablet; Take 1 tablet by mouth Daily.  Dispense: 90 tablet; Refill: 1  I did not enter this diagnosis, and it looks like that the [employer] electronic health record system automatically put this in as somehow linked to her pioglitazone prescription, and I have absolutely no idea why.  She did not report any rectal bleeding to me.  I tried to delete the diagnosis, but the computer says I cannot because it is linked to the Actos prescription, which makes no sense, this is just 1 more flaw with the epic EHR system.  8. Benign essential hypertension    9. Gastroesophageal reflux disease without esophagitis    Other orders  -     escitalopram (LEXAPRO) 10 MG tablet; Take 1 tablet by mouth  Daily.  Dispense: 90 tablet; Refill: 3  -     glimepiride (AMARYL) 2 MG tablet; Take 1 to 3 tablets po qd for diabetes  Dispense: 270 tablet; Refill: 1  -     metFORMIN (GLUCOPHAGE) 500 MG tablet; Take 1 tablet by mouth Daily With Breakfast.  Dispense: 90 tablet; Refill: 1  -     metoprolol succinate XL (TOPROL-XL) 50 MG 24 hr tablet; Take 1 tablet by mouth Daily.  Dispense: 90 tablet; Refill: 3  -     pantoprazole (PROTONIX) 40 MG EC tablet; Take 1 tablet by mouth Daily.  Dispense: 90 tablet; Refill: 3  -     pravastatin (PRAVACHOL) 80 MG tablet; Take 1 tablet by mouth Daily.  Dispense: 90 tablet; Refill: 3  -     valsartan (DIOVAN) 160 MG tablet; Take 1 tablet by mouth Daily.  Dispense: 90 tablet; Refill: 1  -     vitamin B-12 (CYANOCOBALAMIN) 1000 MCG tablet; Take 1 tablet by mouth Daily.  Dispense: 90 tablet; Refill: 3                 Follow Up   Return in about 6 months (around 2/11/2023), or if symptoms worsen or fail to improve, for Nurse - AWV Subsequent, Recheck.  Patient was given instructions and counseling regarding her condition or for health maintenance advice. Please see specific information pulled into the AVS if appropriate.     Jesus Manuel Montes MD

## 2022-08-22 ENCOUNTER — TELEPHONE (OUTPATIENT)
Dept: FAMILY MEDICINE CLINIC | Facility: CLINIC | Age: 82
End: 2022-08-22

## 2022-08-22 ENCOUNTER — OFFICE VISIT (OUTPATIENT)
Dept: FAMILY MEDICINE CLINIC | Facility: CLINIC | Age: 82
End: 2022-08-22

## 2022-08-22 VITALS
WEIGHT: 159.8 LBS | HEIGHT: 66 IN | HEART RATE: 75 BPM | SYSTOLIC BLOOD PRESSURE: 118 MMHG | OXYGEN SATURATION: 97 % | DIASTOLIC BLOOD PRESSURE: 70 MMHG | BODY MASS INDEX: 25.68 KG/M2

## 2022-08-22 DIAGNOSIS — E11.22 TYPE 2 DIABETES MELLITUS WITH STAGE 3B CHRONIC KIDNEY DISEASE, WITHOUT LONG-TERM CURRENT USE OF INSULIN: ICD-10-CM

## 2022-08-22 DIAGNOSIS — N18.32 TYPE 2 DIABETES MELLITUS WITH STAGE 3B CHRONIC KIDNEY DISEASE, WITHOUT LONG-TERM CURRENT USE OF INSULIN: ICD-10-CM

## 2022-08-22 DIAGNOSIS — Z91.81 AT HIGH RISK FOR FALLS: ICD-10-CM

## 2022-08-22 DIAGNOSIS — K21.9 GASTROESOPHAGEAL REFLUX DISEASE WITHOUT ESOPHAGITIS: ICD-10-CM

## 2022-08-22 DIAGNOSIS — F32.A DEPRESSIVE DISORDER: ICD-10-CM

## 2022-08-22 DIAGNOSIS — I10 BENIGN ESSENTIAL HYPERTENSION: ICD-10-CM

## 2022-08-22 DIAGNOSIS — Z00.00 ENCOUNTER FOR SUBSEQUENT ANNUAL WELLNESS VISIT (AWV) IN MEDICARE PATIENT: Primary | ICD-10-CM

## 2022-08-22 DIAGNOSIS — N39.0 CHRONIC UTI: ICD-10-CM

## 2022-08-22 DIAGNOSIS — H91.93 BILATERAL HEARING LOSS, UNSPECIFIED HEARING LOSS TYPE: ICD-10-CM

## 2022-08-22 DIAGNOSIS — M85.88 OSTEOPENIA OF SPINE: ICD-10-CM

## 2022-08-22 DIAGNOSIS — I25.2 HISTORY OF MYOCARDIAL INFARCTION: ICD-10-CM

## 2022-08-22 DIAGNOSIS — E78.2 MIXED HYPERLIPIDEMIA: ICD-10-CM

## 2022-08-22 PROCEDURE — 96160 PT-FOCUSED HLTH RISK ASSMT: CPT | Performed by: FAMILY MEDICINE

## 2022-08-22 PROCEDURE — 1170F FXNL STATUS ASSESSED: CPT | Performed by: FAMILY MEDICINE

## 2022-08-22 PROCEDURE — G0439 PPPS, SUBSEQ VISIT: HCPCS | Performed by: FAMILY MEDICINE

## 2022-08-22 PROCEDURE — 1159F MED LIST DOCD IN RCRD: CPT | Performed by: FAMILY MEDICINE

## 2022-08-22 NOTE — PROGRESS NOTES
QUICK REFERENCE INFORMATION:  The ABCs of the Annual Wellness Visit    Subsequent Medicare Wellness Visit      HEALTH RISK ASSESSMENT    1940    Recent Hospitalizations:  No hospitalization(s) within the last year..    Current Medical Providers:  Patient Care Team:  Cale Pearson MD as PCP - General (Family Medicine)  Leida Monterroso PA-C as Physician Assistant (Gastroenterology)  Roney Johnson MD as Consulting Physician (Gastroenterology)    Smoking Status:  Social History     Tobacco Use   Smoking Status Former Smoker   • Packs/day: 0.25   • Years: 5.00   • Pack years: 1.25   • Quit date:    • Years since quittin.6   Smokeless Tobacco Never Used       Alcohol Consumption:  Social History     Substance and Sexual Activity   Alcohol Use Not Currently       Depression Screen:   PHQ-2/PHQ-9 Depression Screening 2022   Little Interest or Pleasure in Doing Things 0-->not at all   Feeling Down, Depressed or Hopeless 1-->several days   PHQ-9: Brief Depression Severity Measure Score 1       Health Habits and Functional and Cognitive Screening:  Functional & Cognitive Status 2022   Do you have difficulty preparing food and eating? No   Do you have difficulty bathing yourself, getting dressed or grooming yourself? No   Do you have difficulty using the toilet? No   Do you have difficulty moving around from place to place? No   Do you have trouble with steps or getting out of a bed or a chair? No   Current Diet Diabetic Diet   Dental Exam Up to date   Eye Exam Up to date   Exercise (times per week) 0 times per week   Current Exercises Include No Regular Exercise   Do you need help using the phone?  No   Are you deaf or do you have serious difficulty hearing?  No   Do you need help with transportation? Yes   Do you need help shopping? No   Do you need help preparing meals?  No   Do you need help with housework?  No   Do you need help with laundry? No   Do you need help taking your  medications? No   Do you need help managing money? No   Do you ever drive or ride in a car without wearing a seat belt? No   Have you felt unusual stress, anger or loneliness in the last month? No   Who do you live with? Child   If you need help, do you have trouble finding someone available to you? No   Have you been bothered in the last four weeks by sexual problems? No   Do you have difficulty concentrating, remembering or making decisions? Yes       Fall Risk Screen:  DEYVI Fall Risk Assessment was completed, and patient is at HIGH risk for falls. Assessment completed on:2022    ACE III MINI   ATTENTION  What is the year: correct  What is the month of the year: correct  What is the day of the week?: correct  What is the date?: correct  MEMORY  Repeat address three times, only score third attempt: [address] [place], [place]: 7  HOW MANY ANIMALS DID THE PATIENT NAME  Verbal Fluency -- Animal Names (0-25): 14-16  CLOCK DRAWING  Clock Drawing: All Correct  MEMORY RECALL  Tell me what you remember about that name and address we were repeating at the beginnin  ACE TOTAL SCORE  Total ACE Score - <25/30 strongly suggests cognitive impairment; <21/30 almost certainly shows dementia: 21    Does the patient have evidence of cognitive impairment? Yes  Moderate cognitive impairment noted. Short and long term memory deficits noted.    No opioid medication identified on active medication list. I have reviewed chart for other potential  high risk medication/s and harmful drug interactions in the elderly.          Aspirin use counseling: Taking ASA appropriately as indicated    Recent Lab Results:  CMP:  Lab Results   Component Value Date     (H) 2020    BUN 19 2022    CREATININE 1.28 (H) 2022    BCR 15 2022     2022    K 4.9 2022    CO2 23 2022    CALCIUM 9.5 2022    PROTENTOTREF 6.2 2022    ALBUMIN 4.0 2022    LABGLOBREF  2.2 08/11/2022    LABIL2 1.8 08/11/2022    BILITOT 0.2 08/11/2022    ALKPHOS 131 (H) 08/11/2022    AST 15 08/11/2022    ALT 6 08/11/2022     HbA1c:  Lab Results   Component Value Date    HGBA1C 7.6 (H) 08/11/2022    HGBA1C 7.3 (H) 03/04/2020     Microalbumin:  No results found for: MICROALBUR, POCMALB, POCCREAT  Lipid Panel  Lab Results   Component Value Date    TRIG 137 08/11/2022    HDL 37 (L) 08/11/2022    LDL 89 08/11/2022    AST 15 08/11/2022    ALT 6 08/11/2022       Visual Acuity:  No exam data present    Age-appropriate Screening Schedule:  Refer to the list below for future screening recommendations based on patient's age, sex and/or medical conditions. Orders for these recommended tests are listed in the plan section. The patient has been provided with a written plan.    Health Maintenance   Topic Date Due   • DXA SCAN  08/22/2022 (Originally 10/15/2021)   • URINE MICROALBUMIN  09/09/2022 (Originally 1940)   • ZOSTER VACCINE (1 of 2) 08/22/2023 (Originally 2/25/1990)   • INFLUENZA VACCINE  10/01/2022   • DIABETIC EYE EXAM  12/03/2022   • HEMOGLOBIN A1C  02/11/2023   • LIPID PANEL  08/11/2023   • TDAP/TD VACCINES (2 - Tdap) 04/02/2026        Subjective     Jennyfer Portillo is a 82 y.o. female who presents for a Subsequent Wellness Visit.    The following portions of the patient's history were reviewed and updated as appropriate: allergies, current medications, past family history, past medical history, past social history, past surgical history and problem list.    Outpatient Medications Prior to Visit   Medication Sig Dispense Refill   • amLODIPine (NORVASC) 5 MG tablet Take 5 mg by mouth Daily.     • Apoaequorin (PREVAGEN PO) Take  by mouth.     • aspirin 81 MG EC tablet Take 81 mg by mouth Daily.     • cephalexin (KEFLEX) 250 MG capsule Take 250 mg by mouth Every Night.     • Combigan 0.2-0.5 % ophthalmic solution      • escitalopram (LEXAPRO) 10 MG tablet Take 1 tablet by mouth Daily. 90 tablet 3   •  glimepiride (AMARYL) 2 MG tablet Take 1 to 3 tablets po qd for diabetes 270 tablet 1   • latanoprost (XALATAN) 0.005 % ophthalmic solution      • metFORMIN (GLUCOPHAGE) 500 MG tablet Take 1 tablet by mouth Daily With Breakfast. 90 tablet 1   • metoprolol succinate XL (TOPROL-XL) 50 MG 24 hr tablet Take 1 tablet by mouth Daily. 90 tablet 3   • pantoprazole (PROTONIX) 40 MG EC tablet Take 1 tablet by mouth Daily. 90 tablet 3   • pioglitazone (ACTOS) 30 MG tablet Take 1 tablet by mouth Daily. 90 tablet 1   • pravastatin (PRAVACHOL) 80 MG tablet Take 1 tablet by mouth Daily. 90 tablet 3   • valsartan (DIOVAN) 160 MG tablet Take 1 tablet by mouth Daily. 90 tablet 1   • vitamin B-12 (CYANOCOBALAMIN) 1000 MCG tablet Take 1 tablet by mouth Daily. 90 tablet 3     No facility-administered medications prior to visit.       Patient Active Problem List   Diagnosis   • Rectal bleeding   • Chronic UTI   • Chronic low back pain   • Diabetes mellitus (HCC)   • Cancer of biliary tract (HCC)   • Abnormal CT scan, gallbladder   • Weight loss   • Stones common duct   • Right upper quadrant abdominal pain   • Hypertension   • Elevated LFTs   • Elevated CEA   • Bilateral hearing loss   • Benign essential hypertension   • Vitamin D deficiency   • Osteopenia   • Megaloblastic anemia   • History of myocardial infarction   • Encounter for long-term (current) use of other medications   • Depressive disorder   • Chronic obstructive lung disease (HCC)   • Mixed hyperlipidemia   • Compression fracture of T12 vertebra (HCC)   • Gastroesophageal reflux disease without esophagitis   • Encounter for subsequent annual wellness visit (AWV) in Medicare patient   • At high risk for falls       Advance Care Planning:  ACP discussion was held with the patient during this visit. Patient does not have an advance directive, information provided.    Identification of Risk Factors:  Risk factors include: Advance Directive Discussion  Cardiovascular  "risk  Chronic Pain   Dementia/Memory   Diabetic Lab Screening   Fall Risk  Glaucoma Risk  Hearing Problem  Immunizations Discussed/Encouraged (specific immunizations; Shingrix and COVID19 )  Inactivity/Sedentary  Osteoporosis Risk.    Compared to one year ago, the patient feels her physical health is worse.  Compared to one year ago, the patient feels her mental health is worse.    Objective       Vitals:    08/22/22 1311   BP: 118/70   BP Location: Left arm   Patient Position: Sitting   Pulse: 75   SpO2: 97%   Weight: 72.5 kg (159 lb 12.8 oz)   Height: 167.6 cm (66\")   PainSc:   7   PainLoc: Comment: left side     BMI Readings from Last 1 Encounters:   08/22/22 25.79 kg/m²   BMI is above normal parameters. Recommendations include: educational material, exercise counseling and nutrition counseling      Assessment & Plan   Problem List Items Addressed This Visit        Advance Directives and General Issues    At high risk for falls    Current Assessment & Plan     Fall prevention education given.             Cardiac and Vasculature    Benign essential hypertension    Relevant Medications    amLODIPine (NORVASC) 5 MG tablet    metoprolol succinate XL (TOPROL-XL) 50 MG 24 hr tablet    valsartan (DIOVAN) 160 MG tablet    History of myocardial infarction    Mixed hyperlipidemia    Relevant Medications    pravastatin (PRAVACHOL) 80 MG tablet       ENT    Bilateral hearing loss       Endocrine and Metabolic    Diabetes mellitus (HCC)    Current Assessment & Plan     Diabetic education given. Patient advised to check glucose daily, exercise for 30 minutes daily, and follow diabetic diet. Patient educated on importance of daily foot care, annual eye exams, and following medication regimen.         Relevant Medications    glimepiride (AMARYL) 2 MG tablet    metFORMIN (GLUCOPHAGE) 500 MG tablet    pioglitazone (ACTOS) 30 MG tablet       Gastrointestinal Abdominal     Gastroesophageal reflux disease without esophagitis    " Relevant Medications    pantoprazole (PROTONIX) 40 MG EC tablet       Genitourinary and Reproductive     Chronic UTI    Relevant Medications    cephalexin (KEFLEX) 250 MG capsule    Other Relevant Orders    Urinalysis With Culture If Indicated -       Mental Health    Depressive disorder    Relevant Medications    escitalopram (LEXAPRO) 10 MG tablet       Musculoskeletal and Injuries    Osteopenia       Other    Encounter for subsequent annual wellness visit (AWV) in Medicare patient - Primary    Current Assessment & Plan     Updated annual wellness visit checklist.  Immunizations discussed. Patient to follow up with pharmacy about Shingrix and Covid booster. Patient is past routine screening age. DEXA declined. Recommend yearly dental and eye exams. Also discussed monitoring of blood pressure and lipids. We addressed patient self-assessment of health status, frailty, and physical functioning. We reviewed psychosocial risks, behavioral risks, instrumental activities of daily living, and patient health risk assessment. Patient was given a personalized prevention plan.                Patient Self-Management and Personalized Health Advice  The patient has been provided with information about: diet, exercise, prevention of cardiac or vascular disease, fall prevention, designing advance directives and mental health concerns and preventive services including:   · Annual Wellness Visit (AWV)  · Depression Screening (15 minutes face to face, Code ).    Outpatient Encounter Medications as of 8/22/2022   Medication Sig Dispense Refill   • amLODIPine (NORVASC) 5 MG tablet Take 5 mg by mouth Daily.     • Apoaequorin (PREVAGEN PO) Take  by mouth.     • aspirin 81 MG EC tablet Take 81 mg by mouth Daily.     • cephalexin (KEFLEX) 250 MG capsule Take 250 mg by mouth Every Night.     • Combigan 0.2-0.5 % ophthalmic solution      • escitalopram (LEXAPRO) 10 MG tablet Take 1 tablet by mouth Daily. 90 tablet 3   • glimepiride  (AMARYL) 2 MG tablet Take 1 to 3 tablets po qd for diabetes 270 tablet 1   • latanoprost (XALATAN) 0.005 % ophthalmic solution      • metFORMIN (GLUCOPHAGE) 500 MG tablet Take 1 tablet by mouth Daily With Breakfast. 90 tablet 1   • metoprolol succinate XL (TOPROL-XL) 50 MG 24 hr tablet Take 1 tablet by mouth Daily. 90 tablet 3   • pantoprazole (PROTONIX) 40 MG EC tablet Take 1 tablet by mouth Daily. 90 tablet 3   • pioglitazone (ACTOS) 30 MG tablet Take 1 tablet by mouth Daily. 90 tablet 1   • pravastatin (PRAVACHOL) 80 MG tablet Take 1 tablet by mouth Daily. 90 tablet 3   • valsartan (DIOVAN) 160 MG tablet Take 1 tablet by mouth Daily. 90 tablet 1   • vitamin B-12 (CYANOCOBALAMIN) 1000 MCG tablet Take 1 tablet by mouth Daily. 90 tablet 3     No facility-administered encounter medications on file as of 8/22/2022.       Follow Up:  Return in about 1 year (around 8/22/2023) for Medicare Wellness.     There are no Patient Instructions on file for this visit.    An After Visit Summary and PPPS with all of these plans were given to the patient.       I have reviewed and edited information documented by wellness nurse and documentation on diagnoses is my own.

## 2022-08-22 NOTE — TELEPHONE ENCOUNTER
Patient seen for AWV. Patient requests referral to neuro-surgeon due to pain from compression fracture caused by recent fall.

## 2022-08-22 NOTE — TELEPHONE ENCOUNTER
Christine, if the pt is mainly having more low back pain, it could be due to UTI, but we won't know until I see her urine results. Did you say she is going to bring a sample back in? It would be a tremendous waste of effort to go through a neurosurgery referral if she just has a UTI. ALICE

## 2022-08-22 NOTE — ASSESSMENT & PLAN NOTE
Diabetic education given. Patient advised to check glucose daily, exercise for 30 minutes daily, and follow diabetic diet. Patient educated on importance of daily foot care, annual eye exams, and following medication regimen.

## 2022-08-22 NOTE — ASSESSMENT & PLAN NOTE
Updated annual wellness visit checklist.  Immunizations discussed. Patient to follow up with pharmacy about Shingrix and Covid booster. Patient is past routine screening age. DEXA declined. Recommend yearly dental and eye exams. Also discussed monitoring of blood pressure and lipids. We addressed patient self-assessment of health status, frailty, and physical functioning. We reviewed psychosocial risks, behavioral risks, instrumental activities of daily living, and patient health risk assessment. Patient was given a personalized prevention plan.

## 2022-08-22 NOTE — TELEPHONE ENCOUNTER
We discussed this at her appointment 10-12 days ago, and the ER doctor already consulted the neurosurgeon when she was at the ER and they advised the ER MD there was nothing surgical that they could do for her. She told me her pain was bothersome but adequately controlled by tylenol and rest, and she had no neurologic or radicular symptoms then. If I refer to Neurosurgeon, the clinic note will not reflect any symptoms or physical signs on exam that WARRANT a neurosurgery consult, and we did not do any followup imaging of her lumbar spine because she had none of those. Thus, I need to know what has changed, and she MAY have to have a followup appointment--it depends. Sometimes the pt or family are just anxious, but sometimes there is a need to see someone and have MRI, CT, repeat Xrays, etc. Once you let me know, I can answer back. It may be more appropriate to refer to pain management.

## 2022-08-23 NOTE — TELEPHONE ENCOUNTER
Patient notified; They have decided to check for UTI and move forward based on results. Daughter believes pain may be attributed to UTI as well. If not, they will schedule follow up with you.

## 2022-08-30 ENCOUNTER — CLINICAL SUPPORT (OUTPATIENT)
Dept: FAMILY MEDICINE CLINIC | Facility: CLINIC | Age: 82
End: 2022-08-30

## 2022-08-30 DIAGNOSIS — N39.0 CHRONIC UTI: Primary | ICD-10-CM

## 2022-08-30 DIAGNOSIS — E11.22 TYPE 2 DIABETES MELLITUS WITH STAGE 3B CHRONIC KIDNEY DISEASE, WITHOUT LONG-TERM CURRENT USE OF INSULIN: ICD-10-CM

## 2022-08-30 DIAGNOSIS — N18.32 TYPE 2 DIABETES MELLITUS WITH STAGE 3B CHRONIC KIDNEY DISEASE, WITHOUT LONG-TERM CURRENT USE OF INSULIN: ICD-10-CM

## 2022-08-30 LAB
BILIRUB BLD-MCNC: NEGATIVE MG/DL
CLARITY, POC: ABNORMAL
COLOR UR: YELLOW
EXPIRATION DATE: ABNORMAL
GLUCOSE UR STRIP-MCNC: NEGATIVE MG/DL
KETONES UR QL: NEGATIVE
LEUKOCYTE EST, POC: ABNORMAL
Lab: ABNORMAL
NITRITE UR-MCNC: POSITIVE MG/ML
PH UR: 7 [PH] (ref 5–8)
POC CREATININE URINE: 8.8
POC MICROALBUMIN URINE: 80
PROT UR STRIP-MCNC: NEGATIVE MG/DL
RBC # UR STRIP: NEGATIVE /UL
SP GR UR: 1.02 (ref 1–1.03)
UROBILINOGEN UR QL: NORMAL

## 2022-08-30 PROCEDURE — 81003 URINALYSIS AUTO W/O SCOPE: CPT | Performed by: FAMILY MEDICINE

## 2022-08-30 PROCEDURE — 82044 UR ALBUMIN SEMIQUANTITATIVE: CPT | Performed by: FAMILY MEDICINE

## 2022-09-01 ENCOUNTER — TELEPHONE (OUTPATIENT)
Dept: FAMILY MEDICINE CLINIC | Facility: CLINIC | Age: 82
End: 2022-09-01

## 2022-09-01 RX ORDER — AMOXICILLIN AND CLAVULANATE POTASSIUM 500; 125 MG/1; MG/1
1 TABLET, FILM COATED ORAL 2 TIMES DAILY
Qty: 14 TABLET | Refills: 0 | Status: SHIPPED | OUTPATIENT
Start: 2022-09-01 | End: 2022-09-20

## 2022-09-03 LAB
BACTERIA UR CULT: ABNORMAL
BACTERIA UR CULT: ABNORMAL
OTHER ANTIBIOTIC SUSC ISLT: ABNORMAL

## 2022-09-07 RX ORDER — CIPROFLOXACIN 500 MG/1
500 TABLET, FILM COATED ORAL 2 TIMES DAILY
Qty: 10 TABLET | Refills: 0 | Status: SHIPPED | OUTPATIENT
Start: 2022-09-07 | End: 2022-09-20

## 2022-09-14 RX ORDER — CEPHALEXIN 250 MG/1
250 CAPSULE ORAL NIGHTLY
Qty: 90 CAPSULE | Refills: 2 | Status: SHIPPED | OUTPATIENT
Start: 2022-09-14

## 2022-09-14 NOTE — TELEPHONE ENCOUNTER
Caller: [email or web address]    Relationship: Emergency Contact    Best call back number: [phone]  Requested Prescriptions:   Requested Prescriptions     Pending Prescriptions Disp Refills   • cephalexin (KEFLEX) 250 MG capsule       Sig: Take 1 capsule by mouth Every Night.        Pharmacy where request should be sent: [facility] [address] [phone] [facility] [phone] [facility]     Additional details provided by patient: OUT OF MEDICATION    Does the patient have less than a 3 day supply:  [x] Yes  [] No    Alisha Burden Rep   09/14/22 10:55 EDT

## 2022-09-20 RX ORDER — CEPHALEXIN 500 MG/1
500 CAPSULE ORAL 3 TIMES DAILY
Qty: 30 CAPSULE | Refills: 0 | Status: SHIPPED | OUTPATIENT
Start: 2022-09-20 | End: 2022-10-18

## 2022-10-18 ENCOUNTER — OFFICE VISIT (OUTPATIENT)
Dept: FAMILY MEDICINE CLINIC | Facility: CLINIC | Age: 82
End: 2022-10-18

## 2022-10-18 VITALS
OXYGEN SATURATION: 96 % | WEIGHT: 163 LBS | BODY MASS INDEX: 26.2 KG/M2 | DIASTOLIC BLOOD PRESSURE: 80 MMHG | SYSTOLIC BLOOD PRESSURE: 140 MMHG | HEART RATE: 61 BPM | HEIGHT: 66 IN

## 2022-10-18 DIAGNOSIS — Z91.81 AT HIGH RISK FOR FALLS: Primary | ICD-10-CM

## 2022-10-18 DIAGNOSIS — N39.0 CHRONIC UTI: ICD-10-CM

## 2022-10-18 DIAGNOSIS — E78.2 MIXED HYPERLIPIDEMIA: ICD-10-CM

## 2022-10-18 DIAGNOSIS — J44.9 CHRONIC OBSTRUCTIVE PULMONARY DISEASE, UNSPECIFIED COPD TYPE: ICD-10-CM

## 2022-10-18 DIAGNOSIS — Z79.4 TYPE 2 DIABETES MELLITUS WITH STAGE 3B CHRONIC KIDNEY DISEASE, WITH LONG-TERM CURRENT USE OF INSULIN: ICD-10-CM

## 2022-10-18 DIAGNOSIS — D53.1 MEGALOBLASTIC ANEMIA: ICD-10-CM

## 2022-10-18 DIAGNOSIS — E11.22 TYPE 2 DIABETES MELLITUS WITH STAGE 3B CHRONIC KIDNEY DISEASE, WITH LONG-TERM CURRENT USE OF INSULIN: ICD-10-CM

## 2022-10-18 DIAGNOSIS — M85.88 OSTEOPENIA OF LUMBAR SPINE: ICD-10-CM

## 2022-10-18 DIAGNOSIS — I10 PRIMARY HYPERTENSION: ICD-10-CM

## 2022-10-18 DIAGNOSIS — K21.9 GASTROESOPHAGEAL REFLUX DISEASE WITHOUT ESOPHAGITIS: ICD-10-CM

## 2022-10-18 DIAGNOSIS — I25.2 HISTORY OF MYOCARDIAL INFARCTION: ICD-10-CM

## 2022-10-18 DIAGNOSIS — F32.A DEPRESSIVE DISORDER: ICD-10-CM

## 2022-10-18 DIAGNOSIS — N18.32 TYPE 2 DIABETES MELLITUS WITH STAGE 3B CHRONIC KIDNEY DISEASE, WITH LONG-TERM CURRENT USE OF INSULIN: ICD-10-CM

## 2022-10-18 PROBLEM — C24.9: Status: RESOLVED | Noted: 2020-03-04 | Resolved: 2022-10-18

## 2022-10-18 PROCEDURE — 99214 OFFICE O/P EST MOD 30 MIN: CPT | Performed by: FAMILY MEDICINE

## 2022-10-18 NOTE — PROGRESS NOTES
Office Note     Name: Jennyfer Portillo    : 1940     MRN: [id number]     Chief Complaint  Back Pain (Fell in July; Fract[place] spine[place] [facility] Med Refill, and Dark stools (Had colonoscopy and egd done in )    Subjective     History of Present Illness:  Jennyfer Portillo is a 82 y.o. female who presents today for she fell back in July fracture and back, T12 or L2.  And been hurting in her back since.  No other falls but kind of gets lightheaded.  She was open the door and an opening for her, and fell backwards.  (July fracture) noticed that her B12 over .  Having dark runny stools and is on Feosol 65 mg 3 times a week.  Her iron studies were were normal 2022.  Creatinine is down to 1.28, when she had disorientation your urine culture was negative back in 2022.  She lost her house back 2022.  And Sebastian her son is rebuilding it for her.  She has good days and bad days  Review of Systems:   Review of Systems    Past Medical History:   Past Medical History:   Diagnosis Date   • Benign essential hypertension    • Bilateral hearing loss    • Carotid artery disease (HCC)     ANGIOPLASTY, ANOTHER STENT    • Chronic obstructive lung disease (HCC)    • Colon polyp    • Depressive disorder    • Diverticulosis 2016    , IN SIGMOID COLON, INTERNAL HEMMORRHOIDS, REPEAT RECOMMENDED 5 YRSOD SEVERE   • GERD (gastroesophageal reflux disease)    • Glaucoma    • High risk medication use    • Hx of myocardial infarction    • Megaloblastic anemia    • Migraines    • Mixed hyperlipidemia     DUE TO TYPE 2 DIABETES, W. HYPERGLYCEMIA   • Obesity    • Osteopenia     OF BOTH HIPS/ SPINE   • Pregnancy     ,,,,   • Recurrent UTI (urinary tract infection)    • Type 2 diabetes mellitus (HCC)     STAGE 3 CHRONIC KIDNEY DISEASE, W/O LONG  TERM USE OF CURRENT INSULIN   • Vitamin D deficiency        Past Surgical History:   Past Surgical History:   Procedure Laterality Date   •  COLONOSCOPY  2012    MODERATLEY SEVERE DIVERTICLOSIS FOUND IN THE SIGMOID COLON AND DESCENDING COLON. RECOMMENDED IN 3 YRS   • ESSURE TUBAL LIGATION  1968    BILATERAL   • HYSTERECTOMY     • ORIF ANKLE FRACTURE Right 2005    OPEN REDUCTION INTERNAL FIXATION, SUCESSFUL OUTCOME   • RETINAL DETACHMENT SURGERY  2009    REATTACHED , LEFT   • UPPER GASTROINTESTINAL ENDOSCOPY         Family History:   Family History   Problem Relation Age of Onset   • Colon polyps Mother    • Coronary artery disease Mother    • Colon cancer Mother    • Diabetes Mother    • Hyperlipidemia Mother    • Hypertension Mother    • Peripheral vascular disease Mother    • Colon polyps Sister    • Colon cancer Sister    • Colon cancer Sister        Social History:   Social History     Socioeconomic History   • Marital status:    • Number of children: 5   • Highest education level: 12th grade   Tobacco Use   • Smoking status: Former     Packs/day: 0.25     Years: 5.00     Pack years: 1.25     Types: Cigarettes     Quit date:      Years since quittin.8   • Smokeless tobacco: Never   Vaping Use   • Vaping Use: Never used   Substance and Sexual Activity   • Alcohol use: Not Currently   • Drug use: Defer   • Sexual activity: Defer       Immunizations:   Immunization History   Administered Date(s) Administered   • COVID-19 (ProTip) 2021, 2021   • Flu Vaccine Quad PF >36MO 10/29/2014   • Fluzone Quad >6mos (Multi-dose) 10/20/2020   • Influenza, Unspecified 10/20/2020   • Pneumococcal Conjugate 13-Valent (PCV13) 2019   • Pneumococcal Polysaccharide (PPSV23) 2008   • Pneumococcal, Unspecified 2008, 2019   • TD Preservative Free 2016   • Td 2016        Medications:     Current Outpatient Medications:   •  amLODIPine (NORVASC) 5 MG tablet, Take 5 mg by mouth Daily., Disp: , Rfl:   •  aspirin 81 MG EC tablet, Take 81 mg by mouth Daily., Disp: , Rfl:   •  Combigan 0.2-0.5 %  "ophthalmic solution, , Disp: , Rfl:   •  escitalopram (LEXAPRO) 10 MG tablet, Take 1 tablet by mouth Daily., Disp: 90 tablet, Rfl: 3  •  glimepiride (AMARYL) 2 MG tablet, Take 1 to 3 tablets po qd for diabetes, Disp: 270 tablet, Rfl: 1  •  latanoprost (XALATAN) 0.005 % ophthalmic solution, , Disp: , Rfl:   •  metFORMIN (GLUCOPHAGE) 500 MG tablet, Take 1 tablet by mouth 2 (Two) Times a Day With Meals., Disp: 180 tablet, Rfl: 1  •  metoprolol succinate XL (TOPROL-XL) 50 MG 24 hr tablet, Take 1 tablet by mouth Daily., Disp: 90 tablet, Rfl: 3  •  pantoprazole (PROTONIX) 40 MG EC tablet, Take 1 tablet by mouth Daily., Disp: 90 tablet, Rfl: 3  •  pioglitazone (ACTOS) 30 MG tablet, Take 1 tablet by mouth Daily., Disp: 90 tablet, Rfl: 1  •  pravastatin (PRAVACHOL) 80 MG tablet, Take 1 tablet by mouth Daily., Disp: 90 tablet, Rfl: 3  •  valsartan (DIOVAN) 160 MG tablet, Take 1 tablet by mouth Daily., Disp: 90 tablet, Rfl: 1  •  cephalexin (KEFLEX) 250 MG capsule, Take 1 capsule by mouth Every Night., Disp: 90 capsule, Rfl: 2    Allergies:   Allergies   Allergen Reactions   • Rosuvastatin Calcium Unknown - Low Severity   • Sulfa Antibiotics Unknown - Low Severity       Objective     Vital Signs  /80 (BP Location: Left arm, Patient Position: Sitting, Cuff Size: Adult)   Pulse 61   Ht 167.6 cm (66\")   Wt 73.9 kg (163 lb)   SpO2 96%   BMI 26.31 kg/m²   Estimated body mass index is 26.31 kg/m² as calculated from the following:    Height as of this encounter: 167.6 cm (66\").    Weight as of this encounter: 73.9 kg (163 lb).          Physical Exam  Vitals and nursing note reviewed.   Constitutional:       General: She is not in acute distress.     Appearance: Normal appearance. She is normal weight. She is not diaphoretic.   HENT:      Head: Normocephalic and atraumatic.      Mouth/Throat:      Mouth: Mucous membranes are moist.   Eyes:      Extraocular Movements: Extraocular movements intact.      Pupils: Pupils are " equal, round, and reactive to light.   Cardiovascular:      Rate and Rhythm: Normal rate and regular rhythm.      Heart sounds: Normal heart sounds. No murmur heard.  Pulmonary:      Effort: Pulmonary effort is normal. No respiratory distress.      Breath sounds: Normal breath sounds. No wheezing or rales.   Abdominal:      General: Abdomen is flat. Bowel sounds are normal.      Palpations: Abdomen is soft. There is no mass.      Tenderness: There is no guarding or rebound.   Musculoskeletal:         General: Normal range of motion.      Cervical back: Normal range of motion and neck supple.      Right lower leg: No edema.      Left lower leg: No edema.   Skin:     General: Skin is warm and dry.      Findings: Erythema present.   Neurological:      General: No focal deficit present.      Mental Status: She is alert and oriented to person, place, and time.      Motor: No weakness.   Psychiatric:         Mood and Affect: Mood normal.          Procedures     Assessment and Plan     1. At high risk for falls  I feel that the B12 could be high causing her to be off of balance.  Fidelina will take the B12 out of her regimen    2. Primary hypertension  Valsartan amlodipine continued    3. History of myocardial infarction  Continue baby aspirin    4. Mixed hyperlipidemia  Continue pravastatin    5. Type 2 diabetes mellitus with stage 3b chronic kidney disease, with long-term current use of insulin (HCC)  She wants to make sure that they take to glimepiride in the morning and 1 glimepiride in the evening.  Metformin 500 mg twice daily correction  - metFORMIN (GLUCOPHAGE) 500 MG tablet; Take 1 tablet by mouth 2 (Two) Times a Day With Meals.  Dispense: 180 tablet; Refill: 1    6. Gastroesophageal reflux disease without esophagitis  Daily pantoprazole    7. Chronic UTI  Continue nightly Keflex 250 mg    8. Megaloblastic anemia  Back off the B12, and repeat a B12 level in 6 weeks, CMP, A1c, CBC, folic acid.  We will get DEXA scan  as as needed as needed anyway    9. Depressive disorder  Continue Lexapro 10 mg       Follow Up  Return in about 6 weeks (around 11/29/2022), or if symptoms worsen or fail to improve, for Follow Up.    Cale WILSON [facility]  [address] [facility] [address] [id number] [phone]

## 2022-11-29 ENCOUNTER — LAB (OUTPATIENT)
Dept: FAMILY MEDICINE CLINIC | Facility: CLINIC | Age: 82
End: 2022-11-29

## 2022-11-29 DIAGNOSIS — N18.32 TYPE 2 DIABETES MELLITUS WITH STAGE 3B CHRONIC KIDNEY DISEASE, WITHOUT LONG-TERM CURRENT USE OF INSULIN: ICD-10-CM

## 2022-11-29 DIAGNOSIS — E11.22 TYPE 2 DIABETES MELLITUS WITH STAGE 3B CHRONIC KIDNEY DISEASE, WITHOUT LONG-TERM CURRENT USE OF INSULIN: ICD-10-CM

## 2022-11-29 DIAGNOSIS — R19.5 DARK STOOLS: ICD-10-CM

## 2022-11-29 DIAGNOSIS — D64.9 CHRONIC ANEMIA: ICD-10-CM

## 2022-11-29 DIAGNOSIS — D53.1 MEGALOBLASTIC ANEMIA: Primary | ICD-10-CM

## 2022-11-29 DIAGNOSIS — K21.9 GASTROESOPHAGEAL REFLUX DISEASE, UNSPECIFIED WHETHER ESOPHAGITIS PRESENT: ICD-10-CM

## 2022-11-29 PROCEDURE — 36415 COLL VENOUS BLD VENIPUNCTURE: CPT | Performed by: FAMILY MEDICINE

## 2022-11-30 LAB
ALBUMIN SERPL-MCNC: 4.3 G/DL (ref 3.6–4.6)
ALBUMIN/GLOB SERPL: 2.3 {RATIO} (ref 1.2–2.2)
ALP SERPL-CCNC: 65 IU/L (ref 44–121)
ALT SERPL-CCNC: 9 IU/L (ref 0–32)
AST SERPL-CCNC: 18 IU/L (ref 0–40)
BASOPHILS # BLD AUTO: 0 X10E3/UL (ref 0–0.2)
BASOPHILS NFR BLD AUTO: 1 %
BILIRUB SERPL-MCNC: <0.2 MG/DL (ref 0–1.2)
BUN SERPL-MCNC: 16 MG/DL (ref 8–27)
BUN/CREAT SERPL: 13 (ref 12–28)
CALCIUM SERPL-MCNC: 9.4 MG/DL (ref 8.7–10.3)
CHLORIDE SERPL-SCNC: 102 MMOL/L (ref 96–106)
CO2 SERPL-SCNC: 24 MMOL/L (ref 20–29)
CREAT SERPL-MCNC: 1.2 MG/DL (ref 0.57–1)
EGFRCR SERPLBLD CKD-EPI 2021: 45 ML/MIN/1.73
EOSINOPHIL # BLD AUTO: 0.2 X10E3/UL (ref 0–0.4)
EOSINOPHIL NFR BLD AUTO: 3 %
ERYTHROCYTE [DISTWIDTH] IN BLOOD BY AUTOMATED COUNT: 12.3 % (ref 11.7–15.4)
FOLATE SERPL-MCNC: 7.2 NG/ML
GLOBULIN SER CALC-MCNC: 1.9 G/DL (ref 1.5–4.5)
GLUCOSE SERPL-MCNC: 185 MG/DL (ref 70–99)
HBA1C MFR BLD: 6.7 % (ref 4.8–5.6)
HCT VFR BLD AUTO: 34.1 % (ref 34–46.6)
HGB BLD-MCNC: 11.1 G/DL (ref 11.1–15.9)
IMM GRANULOCYTES # BLD AUTO: 0 X10E3/UL (ref 0–0.1)
IMM GRANULOCYTES NFR BLD AUTO: 0 %
IRON SATN MFR SERPL: 22 % (ref 15–55)
IRON SERPL-MCNC: 66 UG/DL (ref 27–139)
LYMPHOCYTES # BLD AUTO: 1.1 X10E3/UL (ref 0.7–3.1)
LYMPHOCYTES NFR BLD AUTO: 19 %
MCH RBC QN AUTO: 32.4 PG (ref 26.6–33)
MCHC RBC AUTO-ENTMCNC: 32.6 G/DL (ref 31.5–35.7)
MCV RBC AUTO: 99 FL (ref 79–97)
MONOCYTES # BLD AUTO: 0.4 X10E3/UL (ref 0.1–0.9)
MONOCYTES NFR BLD AUTO: 7 %
NEUTROPHILS # BLD AUTO: 4 X10E3/UL (ref 1.4–7)
NEUTROPHILS NFR BLD AUTO: 70 %
PLATELET # BLD AUTO: 224 X10E3/UL (ref 150–450)
POTASSIUM SERPL-SCNC: 4.6 MMOL/L (ref 3.5–5.2)
PROT SERPL-MCNC: 6.2 G/DL (ref 6–8.5)
RBC # BLD AUTO: 3.43 X10E6/UL (ref 3.77–5.28)
SODIUM SERPL-SCNC: 140 MMOL/L (ref 134–144)
TIBC SERPL-MCNC: 297 UG/DL (ref 250–450)
UIBC SERPL-MCNC: 231 UG/DL (ref 118–369)
VIT B12 SERPL-MCNC: 900 PG/ML (ref 232–1245)
WBC # BLD AUTO: 5.7 X10E3/UL (ref 3.4–10.8)

## 2022-11-30 NOTE — PROGRESS NOTES
Please let Jennyfer know that her iron profile is normal. CBC reveals Hb 11.1, which is normal. Thanks!

## 2022-12-08 ENCOUNTER — TELEPHONE (OUTPATIENT)
Dept: FAMILY MEDICINE CLINIC | Facility: CLINIC | Age: 82
End: 2022-12-08

## 2022-12-08 NOTE — TELEPHONE ENCOUNTER
"PATIENT CALLED TO UPDATE PRIMARY NUMBER: [phone]    PATIENT WAS RETURNING A MISSED CALL FROM THE OFFICE.     HUB RELAYED \"HUB TO READ\" MESSAGE TO PATIENT AND PATIENT VERBALIZED UNDERSTANDING.     NO FURTHER QUESTIONS AT THIS TIME.   "

## 2022-12-08 NOTE — TELEPHONE ENCOUNTER
Mgiuel Valentino MA   12/8/2022  9:54 AM EST Back to Top      Attempted to reach pt via primary number. Informed from son-in-law this was not a good number to reach pt. Called home phone and pt was unable to contact due to poor call quality.      FOR HUB: If pt calls back, please update primary number in chart    Beatrice Araiza MA   12/6/2022 10:30 AM EST       Called patient, no answer.    Cale Pearson MD   11/30/2022  2:07 PM EST       B12 is now normal at 900.  Good level just not to taking any B12.  Hemoglobin A1c was 6.7 under good control.  Folic acid 7.2 and the CBC was entirely normal hemoglobin up to 11.1 hematocrit 34.1  Glucose was 185 creatinine 1.20 potassium was 4.6 sodium 140 calcium 9.4 all normal

## 2023-02-08 ENCOUNTER — OFFICE VISIT (OUTPATIENT)
Dept: FAMILY MEDICINE CLINIC | Facility: CLINIC | Age: 83
End: 2023-02-08
Payer: MEDICARE

## 2023-02-08 VITALS
BODY MASS INDEX: 25.55 KG/M2 | WEIGHT: 159 LBS | DIASTOLIC BLOOD PRESSURE: 78 MMHG | SYSTOLIC BLOOD PRESSURE: 122 MMHG | OXYGEN SATURATION: 98 % | HEIGHT: 66 IN | HEART RATE: 74 BPM

## 2023-02-08 DIAGNOSIS — N39.0 CHRONIC UTI: Primary | ICD-10-CM

## 2023-02-08 DIAGNOSIS — N18.32 TYPE 2 DIABETES MELLITUS WITH STAGE 3B CHRONIC KIDNEY DISEASE, WITH LONG-TERM CURRENT USE OF INSULIN: ICD-10-CM

## 2023-02-08 DIAGNOSIS — Z79.4 TYPE 2 DIABETES MELLITUS WITH STAGE 3B CHRONIC KIDNEY DISEASE, WITH LONG-TERM CURRENT USE OF INSULIN: ICD-10-CM

## 2023-02-08 DIAGNOSIS — E11.22 TYPE 2 DIABETES MELLITUS WITH STAGE 3B CHRONIC KIDNEY DISEASE, WITH LONG-TERM CURRENT USE OF INSULIN: ICD-10-CM

## 2023-02-08 DIAGNOSIS — I10 PRIMARY HYPERTENSION: ICD-10-CM

## 2023-02-08 LAB
BILIRUB BLD-MCNC: NEGATIVE MG/DL
CLARITY, POC: ABNORMAL
COLOR UR: YELLOW
EXPIRATION DATE: ABNORMAL
GLUCOSE UR STRIP-MCNC: NEGATIVE MG/DL
KETONES UR QL: ABNORMAL
LEUKOCYTE EST, POC: ABNORMAL
Lab: ABNORMAL
NITRITE UR-MCNC: NEGATIVE MG/ML
PH UR: 5.5 [PH] (ref 5–8)
PROT UR STRIP-MCNC: ABNORMAL MG/DL
RBC # UR STRIP: NEGATIVE /UL
SP GR UR: 1.02 (ref 1–1.03)
UROBILINOGEN UR QL: NORMAL

## 2023-02-08 PROCEDURE — 81003 URINALYSIS AUTO W/O SCOPE: CPT | Performed by: FAMILY MEDICINE

## 2023-02-08 PROCEDURE — 99213 OFFICE O/P EST LOW 20 MIN: CPT | Performed by: FAMILY MEDICINE

## 2023-02-08 RX ORDER — AMLODIPINE BESYLATE 10 MG/1
1 TABLET ORAL DAILY
COMMUNITY
Start: 2022-12-02

## 2023-02-08 RX ORDER — CIPROFLOXACIN 250 MG/1
250 TABLET, FILM COATED ORAL 2 TIMES DAILY
Qty: 20 TABLET | Refills: 0 | Status: SHIPPED | OUTPATIENT
Start: 2023-02-08

## 2023-02-08 NOTE — PROGRESS NOTES
Office Note     Name: Jennyfer Portillo    : 1940     MRN: [id number]     Chief Complaint  Urinary Tract Infection and Back Pain    Subjective     History of Present Illness:  Jennyfre Portillo is a 82 y.o. female who presents today for UTI possibility of confusion.  She she is gone to fast-paced 3 times since I last saw her .  She mentions cephalexin 500 3 times daily, Macrodantin, Cipro she has had this chronic UTIs but needs to go to the urologist    Review of Systems:   Review of Systems    Past Medical History:   Past Medical History:   Diagnosis Date   • Benign essential hypertension    • Bilateral hearing loss    • Carotid artery disease (HCC)     ANGIOPLASTY, ANOTHER STENT    • Chronic obstructive lung disease (HCC)    • Colon polyp    • Depressive disorder    • Diverticulosis 2016    , IN SIGMOID COLON, INTERNAL HEMMORRHOIDS, REPEAT RECOMMENDED 5 YRSOD SEVERE   • GERD (gastroesophageal reflux disease)    • Glaucoma    • High risk medication use    • Hx of myocardial infarction    • Megaloblastic anemia    • Migraines    • Mixed hyperlipidemia     DUE TO TYPE 2 DIABETES, W. HYPERGLYCEMIA   • Obesity    • Osteopenia     OF BOTH HIPS/ SPINE   • Pregnancy     2,,1963,195,   • Recurrent UTI (urinary tract infection)    • Type 2 diabetes mellitus (HCC)     STAGE 3 CHRONIC KIDNEY DISEASE, W/O LONG  TERM USE OF CURRENT INSULIN   • Vitamin D deficiency        Past Surgical History:   Past Surgical History:   Procedure Laterality Date   • COLONOSCOPY  2012    MODERATLEY SEVERE DIVERTICLOSIS FOUND IN THE SIGMOID COLON AND DESCENDING COLON. RECOMMENDED IN 3 YRS   • ESSURE TUBAL LIGATION  1968    BILATERAL   • HYSTERECTOMY     • ORIF ANKLE FRACTURE Right     OPEN REDUCTION INTERNAL FIXATION, SUCESSFUL OUTCOME   • RETINAL DETACHMENT SURGERY  2009    REATTACHED , LEFT   • UPPER GASTROINTESTINAL ENDOSCOPY         Family History:   Family History   Problem Relation  Age of Onset   • Colon polyps Mother    • Coronary artery disease Mother    • Colon cancer Mother    • Diabetes Mother    • Hyperlipidemia Mother    • Hypertension Mother    • Peripheral vascular disease Mother    • Colon polyps Sister    • Colon cancer Sister    • Colon cancer Sister        Social History:   Social History     Socioeconomic History   • Marital status:    • Number of children: 5   • Highest education level: 12th grade   Tobacco Use   • Smoking status: Former     Packs/day: 0.25     Years: 5.00     Pack years: 1.25     Types: Cigarettes     Quit date:      Years since quittin.1   • Smokeless tobacco: Never   Vaping Use   • Vaping Use: Never used   Substance and Sexual Activity   • Alcohol use: Not Currently   • Drug use: Defer   • Sexual activity: Defer       Immunizations:   Immunization History   Administered Date(s) Administered   • COVID-19 ([employer]) 2021, 2021   • COVID-19 (PFIZER) BIVALENT BOOSTER 12+YRS 10/26/2022   • Flu Vaccine Quad PF >36MO 10/29/2014   • Fluzone Quad >6mos (Multi-dose) 10/20/2020   • Influenza, Unspecified 10/20/2020   • Pneumococcal Conjugate 13-Valent (PCV13) 2019   • Pneumococcal Polysaccharide (PPSV23) 2008   • Pneumococcal, Unspecified 2008, 2019   • TD Preservative Free 2016   • Td 2016        Medications:     Current Outpatient Medications:   •  alendronate (FOSAMAX) 70 MG tablet, Take 1 tablet by mouth Every 7 (Seven) Days., Disp: 13 tablet, Rfl: 3  •  amLODIPine (NORVASC) 10 MG tablet, Take 1 tablet by mouth Daily., Disp: , Rfl:   •  aspirin 81 MG EC tablet, Take 81 mg by mouth Daily., Disp: , Rfl:   •  cephalexin (KEFLEX) 250 MG capsule, Take 1 capsule by mouth Every Night., Disp: 90 capsule, Rfl: 2  •  Combigan 0.2-0.5 % ophthalmic solution, , Disp: , Rfl:   •  escitalopram (LEXAPRO) 10 MG tablet, Take 1 tablet by mouth Daily., Disp: 90 tablet, Rfl: 3  •  glimepiride (AMARYL) 2 MG tablet, Take 1 to  "3 tablets po qd for diabetes, Disp: 270 tablet, Rfl: 1  •  latanoprost (XALATAN) 0.005 % ophthalmic solution, , Disp: , Rfl:   •  metFORMIN (GLUCOPHAGE) 500 MG tablet, Take 1 tablet by mouth 2 (Two) Times a Day With Meals., Disp: 180 tablet, Rfl: 1  •  metoprolol succinate XL (TOPROL-XL) 50 MG 24 hr tablet, Take 1 tablet by mouth Daily., Disp: 90 tablet, Rfl: 3  •  pantoprazole (PROTONIX) 40 MG EC tablet, Take 1 tablet by mouth Daily., Disp: 90 tablet, Rfl: 3  •  pioglitazone (ACTOS) 30 MG tablet, Take 1 tablet by mouth Daily., Disp: 90 tablet, Rfl: 1  •  pravastatin (PRAVACHOL) 80 MG tablet, Take 1 tablet by mouth Daily., Disp: 90 tablet, Rfl: 3  •  valsartan (DIOVAN) 160 MG tablet, Take 1 tablet by mouth Daily., Disp: 90 tablet, Rfl: 1  •  amLODIPine (NORVASC) 5 MG tablet, Take 5 mg by mouth Daily., Disp: , Rfl:     Allergies:   Allergies   Allergen Reactions   • Rosuvastatin Calcium Unknown - Low Severity   • Sulfa Antibiotics Unknown - Low Severity       Objective     Vital Signs  /78   Pulse 74   Ht 167.6 cm (66\")   Wt 72.1 kg (159 lb)   SpO2 98%   BMI 25.66 kg/m²   Estimated body mass index is 25.66 kg/m² as calculated from the following:    Height as of this encounter: 167.6 cm (66\").    Weight as of this encounter: 72.1 kg (159 lb).          Physical Exam  Vitals and nursing note reviewed.   Constitutional:       Appearance: Normal appearance.   Musculoskeletal:        Arms:       Comments: No CVA tenderness          Procedures     Assessment and Plan     1. Chronic UTI  If UA is positive will call in Cipro 250 twice daily and refer her to Dr. Bhakta  - Ambulatory Referral to Urology    2. Primary hypertension  Well-controlled    3. Type 2 diabetes mellitus with stage 3b chronic kidney disease, with long-term current use of insulin (HCC)  Well-controlled       Follow Up  No follow-ups on file.    Cale WILSON [facility]  [address] [place] [address] " [place] [address] [phone]

## 2023-02-10 LAB
BACTERIA UR CULT: NORMAL
BACTERIA UR CULT: NORMAL

## 2023-05-02 DIAGNOSIS — K62.5 RECTAL BLEEDING: ICD-10-CM

## 2023-05-02 RX ORDER — PIOGLITAZONEHYDROCHLORIDE 30 MG/1
TABLET ORAL
Qty: 90 TABLET | Refills: 0 | Status: SHIPPED | OUTPATIENT
Start: 2023-05-02

## 2023-05-09 RX ORDER — VALSARTAN 160 MG/1
TABLET ORAL
Qty: 90 TABLET | Refills: 0 | Status: SHIPPED | OUTPATIENT
Start: 2023-05-09

## 2023-06-01 ENCOUNTER — OFFICE VISIT (OUTPATIENT)
Dept: FAMILY MEDICINE CLINIC | Facility: CLINIC | Age: 83
End: 2023-06-01

## 2023-06-01 VITALS
HEART RATE: 68 BPM | WEIGHT: 158.13 LBS | DIASTOLIC BLOOD PRESSURE: 64 MMHG | OXYGEN SATURATION: 97 % | BODY MASS INDEX: 25.41 KG/M2 | HEIGHT: 66 IN | SYSTOLIC BLOOD PRESSURE: 120 MMHG

## 2023-06-01 DIAGNOSIS — I10 BENIGN ESSENTIAL HYPERTENSION: ICD-10-CM

## 2023-06-01 DIAGNOSIS — E78.2 MIXED HYPERLIPIDEMIA: ICD-10-CM

## 2023-06-01 DIAGNOSIS — E11.22 TYPE 2 DIABETES MELLITUS WITH STAGE 3B CHRONIC KIDNEY DISEASE, WITH LONG-TERM CURRENT USE OF INSULIN: Primary | ICD-10-CM

## 2023-06-01 DIAGNOSIS — N18.32 TYPE 2 DIABETES MELLITUS WITH STAGE 3B CHRONIC KIDNEY DISEASE, WITH LONG-TERM CURRENT USE OF INSULIN: Primary | ICD-10-CM

## 2023-06-01 DIAGNOSIS — Z79.4 TYPE 2 DIABETES MELLITUS WITH STAGE 3B CHRONIC KIDNEY DISEASE, WITH LONG-TERM CURRENT USE OF INSULIN: Primary | ICD-10-CM

## 2023-06-01 DIAGNOSIS — F32.A DEPRESSIVE DISORDER: ICD-10-CM

## 2023-06-01 DIAGNOSIS — R53.83 OTHER FATIGUE: ICD-10-CM

## 2023-06-01 DIAGNOSIS — D53.1 MEGALOBLASTIC ANEMIA: ICD-10-CM

## 2023-06-01 DIAGNOSIS — E55.9 VITAMIN D DEFICIENCY: ICD-10-CM

## 2023-06-01 RX ORDER — TIZANIDINE 4 MG/1
2 TABLET ORAL 2 TIMES DAILY
Qty: 30 TABLET | Refills: 1 | Status: SHIPPED | OUTPATIENT
Start: 2023-06-01

## 2023-06-01 RX ORDER — BRIMONIDINE TARTRATE AND TIMOLOL MALEATE 2; 5 MG/ML; MG/ML
1 SOLUTION OPHTHALMIC
COMMUNITY

## 2023-06-01 RX ORDER — NITROFURANTOIN MACROCRYSTALS 100 MG/1
CAPSULE ORAL
COMMUNITY
Start: 2023-05-19

## 2023-06-01 NOTE — PROGRESS NOTES
Office Note     Name: Jennyfer Portillo    : 1940     MRN: [id number]     Chief Complaint  Back Pain (Fell 1 mnth ago)    Subjective     History of Present Illness:  Jennyfer Portillo is a 83 y.o. female who presents today for who fell about a month ago and is hurts every time she walks.  She rest it gets well.  She is got no radiation.  States that is not sleeping well.  Had a cystoscope run in May showing only no abnormalities.  Takes to get glimepiride today and 1 metformin is on 1000 every other day, a B12.  She fall but her sitter took the fall for her.  If it is ever pelvic bone sacral bone you fractured it we will get well in 6 weeks    Review of Systems:   Review of Systems    Past Medical History:   Past Medical History:   Diagnosis Date   • Benign essential hypertension    • Bilateral hearing loss    • Carotid artery disease     ANGIOPLASTY, ANOTHER STENT    • Chronic obstructive lung disease    • Colon polyp    • Depressive disorder    • Diverticulosis 2016    , IN SIGMOID COLON, INTERNAL HEMMORRHOIDS, REPEAT RECOMMENDED 5 YRSOD SEVERE   • GERD (gastroesophageal reflux disease)    • Glaucoma    • High risk medication use    • Hx of myocardial infarction    • Megaloblastic anemia    • Migraines    • Mixed hyperlipidemia     DUE TO TYPE 2 DIABETES, W. HYPERGLYCEMIA   • Obesity    • Osteopenia     OF BOTH HIPS/ SPINE   • Pregnancy     2,1,1963,1958,   • Recurrent UTI (urinary tract infection)    • Type 2 diabetes mellitus     STAGE 3 CHRONIC KIDNEY DISEASE, W/O LONG  TERM USE OF CURRENT INSULIN   • Vitamin D deficiency        Past Surgical History:   Past Surgical History:   Procedure Laterality Date   • COLONOSCOPY  2012    MODERATLEY SEVERE DIVERTICLOSIS FOUND IN THE SIGMOID COLON AND DESCENDING COLON. RECOMMENDED IN 3 YRS   • ESSURE TUBAL LIGATION      BILATERAL   • HYSTERECTOMY     • ORIF ANKLE FRACTURE Right     OPEN REDUCTION INTERNAL FIXATION, SUCESSFUL  OUTCOME   • RETINAL DETACHMENT SURGERY  2009    REATTACHED , LEFT   • UPPER GASTROINTESTINAL ENDOSCOPY         Family History:   Family History   Problem Relation Age of Onset   • Colon polyps Mother    • Coronary artery disease Mother    • Colon cancer Mother    • Diabetes Mother    • Hyperlipidemia Mother    • Hypertension Mother    • Peripheral vascular disease Mother    • Colon polyps Sister    • Colon cancer Sister    • Colon cancer Sister        Social History:   Social History     Socioeconomic History   • Marital status:    • Number of children: 5   • Highest education level: 12th grade   Tobacco Use   • Smoking status: Former     Packs/day: 0.25     Years: 5.00     Pack years: 1.25     Types: Cigarettes     Quit date:      Years since quittin.4   • Smokeless tobacco: Never   Vaping Use   • Vaping Use: Never used   Substance and Sexual Activity   • Alcohol use: Not Currently   • Drug use: Defer   • Sexual activity: Defer       Immunizations:   Immunization History   Administered Date(s) Administered   • COVID-19 (PFIZER) BIVALENT 12+YRS 10/26/2022   • Flu Vaccine Quad PF >36MO 10/29/2014   • Fluzone Quad >6mos (Multi-dose) 10/20/2020   • Influenza, Unspecified 10/20/2020   • Pneumococcal Conjugate 13-Valent (PCV13) 2019   • Pneumococcal Polysaccharide (PPSV23) 2008   • Pneumococcal, Unspecified 2008, 2019   • TD Preservative Free 2016   • Td 2016        Medications:     Current Outpatient Medications:   •  alendronate (FOSAMAX) 70 MG tablet, Take 1 tablet by mouth Every 7 (Seven) Days., Disp: 13 tablet, Rfl: 3  •  amLODIPine (NORVASC) 10 MG tablet, Take 1 tablet by mouth Daily., Disp: , Rfl:   •  aspirin 81 MG EC tablet, Take 1 tablet by mouth Daily., Disp: , Rfl:   •  brimonidine-timolol (COMBIGAN) 0.2-0.5 % ophthalmic solution, 1 drop., Disp: , Rfl:   •  Combigan 0.2-0.5 % ophthalmic solution, , Disp: , Rfl:   •  escitalopram (LEXAPRO) 10 MG tablet,  "Take 1 tablet by mouth Daily., Disp: 90 tablet, Rfl: 3  •  glimepiride (AMARYL) 2 MG tablet, Take 1 to 3 tablets po qd for diabetes (Patient taking differently: Take 1 tablet by mouth 2 (Two) Times a Day. Take 1 to 3 tablets po qd for diabetes), Disp: 270 tablet, Rfl: 1  •  latanoprost (XALATAN) 0.005 % ophthalmic solution, , Disp: , Rfl:   •  metFORMIN (GLUCOPHAGE) 500 MG tablet, Take 1 tablet by mouth 2 (Two) Times a Day With Meals. (Patient taking differently: Take 1 tablet by mouth 1 (One) Time.), Disp: 180 tablet, Rfl: 1  •  metoprolol succinate XL (TOPROL-XL) 50 MG 24 hr tablet, Take 1 tablet by mouth Daily., Disp: 90 tablet, Rfl: 3  •  nitrofurantoin (MACRODANTIN) 100 MG capsule, , Disp: , Rfl:   •  pantoprazole (PROTONIX) 40 MG EC tablet, Take 1 tablet by mouth Daily., Disp: 90 tablet, Rfl: 3  •  pioglitazone (ACTOS) 30 MG tablet, TAKE 1 TABLET BY MOUTH EVERY DAY, Disp: 90 tablet, Rfl: 0  •  pravastatin (PRAVACHOL) 80 MG tablet, Take 1 tablet by mouth Daily., Disp: 90 tablet, Rfl: 3  •  valsartan (DIOVAN) 160 MG tablet, TAKE 1 TABLET BY MOUTH EVERY DAY, Disp: 90 tablet, Rfl: 0    Allergies:   Allergies   Allergen Reactions   • Atorvastatin Unknown - High Severity     Other reaction(s): Leg cramps..   • Rosuvastatin Calcium Unknown - Low Severity   • Sulfa Antibiotics Unknown - Low Severity       Objective     Vital Signs  /64   Pulse 68   Ht 167.6 cm (66\")   Wt 71.7 kg (158 lb 2 oz)   SpO2 97%   BMI 25.52 kg/m²   Estimated body mass index is 25.52 kg/m² as calculated from the following:    Height as of this encounter: 167.6 cm (66\").    Weight as of this encounter: 71.7 kg (158 lb 2 oz).          Physical Exam  Vitals and nursing note reviewed.   Constitutional:       Appearance: Normal appearance. She is normal weight.   HENT:      Head: Normocephalic.      Right Ear: External ear normal.      Left Ear: External ear normal.      Nose: Nose normal.   Eyes:      Pupils: Pupils are equal, round, and " reactive to light.   Neck:      Vascular: No carotid bruit.   Cardiovascular:      Rate and Rhythm: Normal rate and regular rhythm.      Pulses: Normal pulses.   Musculoskeletal:      Cervical back: Normal range of motion and neck supple.        Legs:       Comments: Tender here x 1 month.   Skin:     General: Skin is warm and dry.   Neurological:      Mental Status: She is alert.   Psychiatric:         Mood and Affect: Mood normal.          Procedures     Assessment and Plan     1. Type 2 diabetes mellitus with stage 3b chronic kidney disease, with long-term current use of insulin  We will see what it shows if 5.6 or below will have the sitter take once daily  - Hemoglobin A1c    2. Vitamin D deficiency  We will see what the blood work shows  - Vitamin D,25-Hydroxy    3. Megaloblastic anemia  We will see  - Vitamin B12 & Folate    4. Depressive disorder  Controlled    5. Mixed hyperlipidemia  Controlled  - Lipid Panel    6. Benign essential hypertension  Well-controlled  - Comprehensive Metabolic Panel    7. Other fatigue    - CBC & Differential  - TSH       Follow Up  Return in about 6 months (around 12/1/2023).    Cale WILSON [facility]  [address] [place] [address] [phone]

## 2023-06-02 LAB
25(OH)D3+25(OH)D2 SERPL-MCNC: 31.4 NG/ML (ref 30–100)
ALBUMIN SERPL-MCNC: 4.4 G/DL (ref 3.6–4.6)
ALBUMIN/GLOB SERPL: 2.4 {RATIO} (ref 1.2–2.2)
ALP SERPL-CCNC: 43 IU/L (ref 44–121)
ALT SERPL-CCNC: 8 IU/L (ref 0–32)
AST SERPL-CCNC: 15 IU/L (ref 0–40)
BASOPHILS # BLD AUTO: 0 X10E3/UL (ref 0–0.2)
BASOPHILS NFR BLD AUTO: 1 %
BILIRUB SERPL-MCNC: 0.2 MG/DL (ref 0–1.2)
BUN SERPL-MCNC: 22 MG/DL (ref 8–27)
BUN/CREAT SERPL: 17 (ref 12–28)
CALCIUM SERPL-MCNC: 9.4 MG/DL (ref 8.7–10.3)
CHLORIDE SERPL-SCNC: 101 MMOL/L (ref 96–106)
CHOLEST SERPL-MCNC: 204 MG/DL (ref 100–199)
CO2 SERPL-SCNC: 23 MMOL/L (ref 20–29)
CREAT SERPL-MCNC: 1.27 MG/DL (ref 0.57–1)
EGFRCR SERPLBLD CKD-EPI 2021: 42 ML/MIN/1.73
EOSINOPHIL # BLD AUTO: 0.1 X10E3/UL (ref 0–0.4)
EOSINOPHIL NFR BLD AUTO: 3 %
ERYTHROCYTE [DISTWIDTH] IN BLOOD BY AUTOMATED COUNT: 12.6 % (ref 11.7–15.4)
FOLATE SERPL-MCNC: 7.2 NG/ML
GLOBULIN SER CALC-MCNC: 1.8 G/DL (ref 1.5–4.5)
GLUCOSE SERPL-MCNC: 214 MG/DL (ref 70–99)
HBA1C MFR BLD: 6.9 % (ref 4.8–5.6)
HCT VFR BLD AUTO: 34.8 % (ref 34–46.6)
HDLC SERPL-MCNC: 56 MG/DL
HGB BLD-MCNC: 11.3 G/DL (ref 11.1–15.9)
IMM GRANULOCYTES # BLD AUTO: 0 X10E3/UL (ref 0–0.1)
IMM GRANULOCYTES NFR BLD AUTO: 0 %
LDLC SERPL CALC-MCNC: 130 MG/DL (ref 0–99)
LYMPHOCYTES # BLD AUTO: 1 X10E3/UL (ref 0.7–3.1)
LYMPHOCYTES NFR BLD AUTO: 25 %
MCH RBC QN AUTO: 31.7 PG (ref 26.6–33)
MCHC RBC AUTO-ENTMCNC: 32.5 G/DL (ref 31.5–35.7)
MCV RBC AUTO: 98 FL (ref 79–97)
MONOCYTES # BLD AUTO: 0.3 X10E3/UL (ref 0.1–0.9)
MONOCYTES NFR BLD AUTO: 8 %
NEUTROPHILS # BLD AUTO: 2.6 X10E3/UL (ref 1.4–7)
NEUTROPHILS NFR BLD AUTO: 63 %
PLATELET # BLD AUTO: 203 X10E3/UL (ref 150–450)
POTASSIUM SERPL-SCNC: 4.7 MMOL/L (ref 3.5–5.2)
PROT SERPL-MCNC: 6.2 G/DL (ref 6–8.5)
RBC # BLD AUTO: 3.57 X10E6/UL (ref 3.77–5.28)
SODIUM SERPL-SCNC: 138 MMOL/L (ref 134–144)
TRIGL SERPL-MCNC: 100 MG/DL (ref 0–149)
TSH SERPL DL<=0.005 MIU/L-ACNC: 3.26 UIU/ML (ref 0.45–4.5)
VIT B12 SERPL-MCNC: 1392 PG/ML (ref 232–1245)
VLDLC SERPL CALC-MCNC: 18 MG/DL (ref 5–40)
WBC # BLD AUTO: 4 X10E3/UL (ref 3.4–10.8)

## 2023-08-07 DIAGNOSIS — K62.5 RECTAL BLEEDING: ICD-10-CM

## 2023-08-07 RX ORDER — PIOGLITAZONEHYDROCHLORIDE 30 MG/1
TABLET ORAL
Qty: 90 TABLET | Refills: 0 | Status: SHIPPED | OUTPATIENT
Start: 2023-08-07

## 2023-08-07 RX ORDER — VALSARTAN 160 MG/1
TABLET ORAL
Qty: 90 TABLET | Refills: 0 | Status: SHIPPED | OUTPATIENT
Start: 2023-08-07

## 2023-09-21 RX ORDER — PRAVASTATIN SODIUM 80 MG/1
TABLET ORAL
Qty: 90 TABLET | Refills: 2 | OUTPATIENT
Start: 2023-09-21

## 2023-09-21 RX ORDER — TIZANIDINE 4 MG/1
TABLET ORAL
Qty: 30 TABLET | Refills: 3 | Status: SHIPPED | OUTPATIENT
Start: 2023-09-21

## 2023-09-25 RX ORDER — PRAVASTATIN SODIUM 80 MG/1
TABLET ORAL
Qty: 90 TABLET | Refills: 2 | Status: SHIPPED | OUTPATIENT
Start: 2023-09-25

## 2023-10-02 RX ORDER — ESCITALOPRAM OXALATE 10 MG/1
TABLET ORAL
Qty: 90 TABLET | Refills: 2 | OUTPATIENT
Start: 2023-10-02

## 2023-10-02 RX ORDER — PANTOPRAZOLE SODIUM 40 MG/1
TABLET, DELAYED RELEASE ORAL
Qty: 90 TABLET | Refills: 2 | OUTPATIENT
Start: 2023-10-02

## 2023-10-02 RX ORDER — METOPROLOL SUCCINATE 50 MG/1
TABLET, EXTENDED RELEASE ORAL
Qty: 90 TABLET | Refills: 2 | OUTPATIENT
Start: 2023-10-02

## 2023-10-03 RX ORDER — PANTOPRAZOLE SODIUM 40 MG/1
TABLET, DELAYED RELEASE ORAL
Qty: 90 TABLET | Refills: 2 | OUTPATIENT
Start: 2023-10-03

## 2023-10-03 RX ORDER — GLIMEPIRIDE 2 MG/1
TABLET ORAL
Qty: 270 TABLET | Refills: 0 | OUTPATIENT
Start: 2023-10-03

## 2023-10-03 RX ORDER — METOPROLOL SUCCINATE 50 MG/1
TABLET, EXTENDED RELEASE ORAL
Qty: 90 TABLET | Refills: 2 | OUTPATIENT
Start: 2023-10-03

## 2023-10-09 ENCOUNTER — TELEPHONE (OUTPATIENT)
Dept: FAMILY MEDICINE CLINIC | Facility: CLINIC | Age: 83
End: 2023-10-09
Payer: MEDICARE

## 2023-10-09 DIAGNOSIS — N18.32 TYPE 2 DIABETES MELLITUS WITH STAGE 3B CHRONIC KIDNEY DISEASE, WITH LONG-TERM CURRENT USE OF INSULIN: Primary | ICD-10-CM

## 2023-10-09 DIAGNOSIS — E11.22 TYPE 2 DIABETES MELLITUS WITH STAGE 3B CHRONIC KIDNEY DISEASE, WITH LONG-TERM CURRENT USE OF INSULIN: Primary | ICD-10-CM

## 2023-10-09 DIAGNOSIS — Z79.4 TYPE 2 DIABETES MELLITUS WITH STAGE 3B CHRONIC KIDNEY DISEASE, WITH LONG-TERM CURRENT USE OF INSULIN: Primary | ICD-10-CM

## 2023-10-09 DIAGNOSIS — I10 PRIMARY HYPERTENSION: ICD-10-CM

## 2023-10-09 DIAGNOSIS — K21.9 GASTROESOPHAGEAL REFLUX DISEASE WITHOUT ESOPHAGITIS: ICD-10-CM

## 2023-10-09 RX ORDER — PANTOPRAZOLE SODIUM 40 MG/1
40 TABLET, DELAYED RELEASE ORAL DAILY
Qty: 90 TABLET | Refills: 0 | Status: SHIPPED | OUTPATIENT
Start: 2023-10-09

## 2023-10-09 RX ORDER — GLIMEPIRIDE 2 MG/1
TABLET ORAL
Qty: 270 TABLET | Refills: 1 | Status: SHIPPED | OUTPATIENT
Start: 2023-10-09

## 2023-10-09 RX ORDER — METOPROLOL SUCCINATE 50 MG/1
50 TABLET, EXTENDED RELEASE ORAL DAILY
Qty: 90 TABLET | Refills: 0 | Status: SHIPPED | OUTPATIENT
Start: 2023-10-09

## 2023-10-09 NOTE — TELEPHONE ENCOUNTER
Patient needs a refill on her Glimepiride, metoprolol, and pantoprazole sent to [facility], she is out.

## 2023-10-23 RX ORDER — ESCITALOPRAM OXALATE 10 MG/1
10 TABLET ORAL DAILY
Qty: 60 TABLET | Refills: 0 | Status: SHIPPED | OUTPATIENT
Start: 2023-10-23

## 2023-10-23 NOTE — TELEPHONE ENCOUNTER
Caller: DHRUV WILSON    Relationship: Emergency Contact    Best call back number: [phone]    Requested Prescriptions:   Requested Prescriptions     Pending Prescriptions Disp Refills    escitalopram (LEXAPRO) 10 MG tablet 90 tablet 3     Sig: Take 1 tablet by mouth Daily.        Pharmacy where request should be sent: [facility] [address] [facility] [phone] [facility] [phone] FX     Last office visit with prescribing clinician: 6/1/2023   Last telemedicine visit with prescribing clinician: Visit date not found   Next office visit with prescribing clinician: Visit date not found     Additional details provided by patient:     Does the patient have less than a 3 day supply:  [x] Yes  [] No    SiAlisha Alcazar Rep   10/23/23 10:59 EDT

## 2023-10-24 RX ORDER — CEPHALEXIN 250 MG/1
250 CAPSULE ORAL NIGHTLY
Qty: 90 CAPSULE | Refills: 1 | OUTPATIENT
Start: 2023-10-24

## 2023-11-01 RX ORDER — ALENDRONATE SODIUM 70 MG/1
70 TABLET ORAL
Qty: 5 TABLET | Refills: 0 | Status: SHIPPED | OUTPATIENT
Start: 2023-11-01

## 2023-11-06 DIAGNOSIS — K62.5 RECTAL BLEEDING: ICD-10-CM

## 2023-11-07 RX ORDER — PIOGLITAZONEHYDROCHLORIDE 30 MG/1
TABLET ORAL
Qty: 30 TABLET | Refills: 0 | Status: SHIPPED | OUTPATIENT
Start: 2023-11-07

## 2023-11-07 RX ORDER — VALSARTAN 160 MG/1
TABLET ORAL
Qty: 30 TABLET | Refills: 0 | Status: SHIPPED | OUTPATIENT
Start: 2023-11-07

## 2023-12-11 ENCOUNTER — OFFICE VISIT (OUTPATIENT)
Dept: FAMILY MEDICINE CLINIC | Facility: CLINIC | Age: 83
End: 2023-12-11
Payer: MEDICARE

## 2023-12-11 VITALS
BODY MASS INDEX: 25.55 KG/M2 | DIASTOLIC BLOOD PRESSURE: 80 MMHG | HEIGHT: 66 IN | WEIGHT: 159 LBS | SYSTOLIC BLOOD PRESSURE: 122 MMHG | HEART RATE: 70 BPM | OXYGEN SATURATION: 98 %

## 2023-12-11 DIAGNOSIS — R82.90 ABNORMAL URINE: ICD-10-CM

## 2023-12-11 DIAGNOSIS — N18.32 TYPE 2 DIABETES MELLITUS WITH STAGE 3B CHRONIC KIDNEY DISEASE, WITH LONG-TERM CURRENT USE OF INSULIN: ICD-10-CM

## 2023-12-11 DIAGNOSIS — I10 PRIMARY HYPERTENSION: ICD-10-CM

## 2023-12-11 DIAGNOSIS — E11.22 TYPE 2 DIABETES MELLITUS WITH STAGE 3B CHRONIC KIDNEY DISEASE, WITH LONG-TERM CURRENT USE OF INSULIN: ICD-10-CM

## 2023-12-11 DIAGNOSIS — Z79.4 TYPE 2 DIABETES MELLITUS WITH STAGE 3B CHRONIC KIDNEY DISEASE, WITH LONG-TERM CURRENT USE OF INSULIN: ICD-10-CM

## 2023-12-11 DIAGNOSIS — E11.40 TYPE 2 DIABETES MELLITUS WITH DIABETIC NEUROPATHY, UNSPECIFIED WHETHER LONG TERM INSULIN USE: Primary | ICD-10-CM

## 2023-12-11 DIAGNOSIS — K21.9 GASTROESOPHAGEAL REFLUX DISEASE WITHOUT ESOPHAGITIS: ICD-10-CM

## 2023-12-11 DIAGNOSIS — M81.0 AGE-RELATED OSTEOPOROSIS WITHOUT CURRENT PATHOLOGICAL FRACTURE: ICD-10-CM

## 2023-12-11 DIAGNOSIS — K62.5 RECTAL BLEEDING: ICD-10-CM

## 2023-12-11 LAB
BILIRUB BLD-MCNC: NEGATIVE MG/DL
CLARITY, POC: ABNORMAL
COLOR UR: ABNORMAL
EXPIRATION DATE: ABNORMAL
GLUCOSE UR STRIP-MCNC: NEGATIVE MG/DL
HBA1C MFR BLD: 6.6 % (ref 4.5–5.7)
KETONES UR QL: NEGATIVE
LEUKOCYTE EST, POC: ABNORMAL
Lab: ABNORMAL
NITRITE UR-MCNC: NEGATIVE MG/ML
PH UR: 6 [PH] (ref 5–8)
POC CREATININE URINE: 200
POC MICROALBUMIN URINE: 80
PROT UR STRIP-MCNC: ABNORMAL MG/DL
RBC # UR STRIP: NEGATIVE /UL
SP GR UR: 1.02 (ref 1–1.03)
UROBILINOGEN UR QL: ABNORMAL

## 2023-12-11 RX ORDER — VALSARTAN 160 MG/1
160 TABLET ORAL DAILY
Qty: 90 TABLET | Refills: 1 | Status: SHIPPED | OUTPATIENT
Start: 2023-12-11

## 2023-12-11 RX ORDER — PANTOPRAZOLE SODIUM 40 MG/1
40 TABLET, DELAYED RELEASE ORAL DAILY
Qty: 90 TABLET | Refills: 3 | Status: SHIPPED | OUTPATIENT
Start: 2023-12-11

## 2023-12-11 RX ORDER — GLIMEPIRIDE 2 MG/1
TABLET ORAL
Qty: 270 TABLET | Refills: 1 | Status: SHIPPED | OUTPATIENT
Start: 2023-12-11

## 2023-12-11 RX ORDER — ESCITALOPRAM OXALATE 10 MG/1
10 TABLET ORAL DAILY
Qty: 90 TABLET | Refills: 3 | Status: SHIPPED | OUTPATIENT
Start: 2023-12-11

## 2023-12-11 RX ORDER — PIOGLITAZONEHYDROCHLORIDE 30 MG/1
30 TABLET ORAL DAILY
Qty: 90 TABLET | Refills: 1 | Status: SHIPPED | OUTPATIENT
Start: 2023-12-11

## 2023-12-11 RX ORDER — ALENDRONATE SODIUM 70 MG/1
70 TABLET ORAL
Qty: 12 TABLET | Refills: 1 | Status: SHIPPED | OUTPATIENT
Start: 2023-12-11

## 2023-12-11 RX ORDER — METOPROLOL SUCCINATE 50 MG/1
50 TABLET, EXTENDED RELEASE ORAL DAILY
Qty: 90 TABLET | Refills: 3 | Status: SHIPPED | OUTPATIENT
Start: 2023-12-11

## 2023-12-11 NOTE — PROGRESS NOTES
Office Note     Name: Jennyfer Portillo    : 1940     MRN: [id number]     Chief Complaint  Diabetes (Follow up) and Med Refill    Subjective     History of Present Illness:  Jennyfer Portillo is a 83 y.o. female who presents today for for diabetes type 2, hypertension, frequent urinary tract infections despite urology referral, and dementia, and osteoporosis.  States that she will have a monthly UTI despite the Macrodantin coverage.  Hemoglobin A1c was 6.7%    Review of Systems:   Review of Systems    Past Medical History:   Past Medical History:   Diagnosis Date    Benign essential hypertension     Bilateral hearing loss     Carotid artery disease     ANGIOPLASTY, ANOTHER STENT     Chronic obstructive lung disease     Colon polyp     Depressive disorder     Diverticulosis 2016    , IN SIGMOID COLON, INTERNAL HEMMORRHOIDS, REPEAT RECOMMENDED 5 YRSOD SEVERE    GERD (gastroesophageal reflux disease)     Glaucoma     High risk medication use     Hx of myocardial infarction     Megaloblastic anemia     Migraines     Mixed hyperlipidemia     DUE TO TYPE 2 DIABETES, W. HYPERGLYCEMIA    Obesity     Osteopenia     OF BOTH HIPS/ SPINE    Pregnancy     1962,1,1963,1958,    Recurrent UTI (urinary tract infection)     Type 2 diabetes mellitus     STAGE 3 CHRONIC KIDNEY DISEASE, W/O LONG  TERM USE OF CURRENT INSULIN    Vitamin D deficiency        Past Surgical History:   Past Surgical History:   Procedure Laterality Date    COLONOSCOPY  2012    MODERATLEY SEVERE DIVERTICLOSIS FOUND IN THE SIGMOID COLON AND DESCENDING COLON. RECOMMENDED IN 3 YRS    ESSURE TUBAL LIGATION  1968    BILATERAL    HYSTERECTOMY  1979    ORIF ANKLE FRACTURE Right 2005    OPEN REDUCTION INTERNAL FIXATION, SUCESSFUL OUTCOME    RETINAL DETACHMENT SURGERY  2009    REATTACHED , LEFT    UPPER GASTROINTESTINAL ENDOSCOPY         Family History:   Family History   Problem Relation Age of Onset    Colon polyps Mother     Coronary  artery disease Mother     Colon cancer Mother     Diabetes Mother     Hyperlipidemia Mother     Hypertension Mother     Peripheral vascular disease Mother     Colon polyps Sister     Colon cancer Sister     Colon cancer Sister        Social History:   Social History     Socioeconomic History    Marital status:     Number of children: 5    Highest education level: 12th grade   Tobacco Use    Smoking status: Former     Packs/day: 0.25     Years: 5.00     Additional pack years: 0.00     Total pack years: 1.25     Types: Cigarettes     Quit date:      Years since quittin.9     Passive exposure: Past    Smokeless tobacco: Never   Vaping Use    Vaping Use: Never used   Substance and Sexual Activity    Alcohol use: Not Currently    Drug use: Defer    Sexual activity: Defer       Immunizations:   Immunization History   Administered Date(s) Administered    COVID-19 ([employer]) 2021, 2021    COVID-19 (P[employer]ER) BIVALENT 12+YRS 10/26/2022    COVID-19 F23 ([employer]NA) 12YRS+ ([employer]) 2023    Flu Vaccine Quad PF >36MO 10/29/2014    Fluzone High-Dose 65+yrs 10/31/2023    Fluzone Quad >6mos (Multi-dose) 10/20/2020    Influenza, Unspecified 10/20/2020    Pneumococcal Conjugate 13-Valent (PCV13) 2019    Pneumococcal Polysaccharide (PPSV23) 2008    Pneumococcal, Unspecified 2008, 2019    TD Preservative Free ([employer]) 2016    Td (TDVAX) 2016        Medications:     Current Outpatient Medications:     alendronate (FOSAMAX) 70 MG tablet, Take 1 tablet by mouth Every 7 (Seven) Days., Disp: 12 tablet, Rfl: 1    amLODIPine (NORVASC) 10 MG tablet, Take 1 tablet by mouth Daily., Disp: , Rfl:     aspirin 81 MG EC tablet, Take 1 tablet by mouth Daily., Disp: , Rfl:     brimonidine-timolol (COMBIGAN) 0.2-0.5 % ophthalmic solution, 1 drop., Disp: , Rfl:     Combigan 0.2-0.5 % ophthalmic solution, , Disp: , Rfl:     escitalopram (LEXAPRO) 10 MG tablet, Take 1 tablet by mouth  "Daily., Disp: 90 tablet, Rfl: 3    glimepiride (AMARYL) 2 MG tablet, Take 1 to 3 tablets po qd for diabetes, Disp: 270 tablet, Rfl: 1    latanoprost (XALATAN) 0.005 % ophthalmic solution, , Disp: , Rfl:     metFORMIN (GLUCOPHAGE) 500 MG tablet, Take 1 tablet by mouth Daily With Breakfast., Disp: 90 tablet, Rfl: 1    metoprolol succinate XL (TOPROL-XL) 50 MG 24 hr tablet, Take 1 tablet by mouth Daily., Disp: 90 tablet, Rfl: 3    pantoprazole (PROTONIX) 40 MG EC tablet, Take 1 tablet by mouth Daily., Disp: 90 tablet, Rfl: 3    pioglitazone (ACTOS) 30 MG tablet, Take 1 tablet by mouth Daily., Disp: 90 tablet, Rfl: 1    pravastatin (PRAVACHOL) 80 MG tablet, TAKE 1 TABLET BY MOUTH EVERY DAY, Disp: 90 tablet, Rfl: 2    tiZANidine (ZANAFLEX) 4 MG tablet, TAKE 1/2 TABLET BY MOUTH 2 TIMES A DAY (Patient taking differently: Take 0.5 tablets by mouth 1 (One) Time. At night), Disp: 30 tablet, Rfl: 3    valsartan (DIOVAN) 160 MG tablet, Take 1 tablet by mouth Daily., Disp: 90 tablet, Rfl: 1    nitrofurantoin (MACRODANTIN) 100 MG capsule, , Disp: , Rfl:     Allergies:   Allergies   Allergen Reactions    Atorvastatin Unknown - High Severity     Other reaction(s): Leg cramps..    Rosuvastatin Calcium Unknown - Low Severity    Sulfa Antibiotics Unknown - Low Severity       Objective     Vital Signs  /80   Pulse 70   Ht 167.6 cm (66\")   Wt 72.1 kg (159 lb)   SpO2 98%   BMI 25.66 kg/m²   Estimated body mass index is 25.66 kg/m² as calculated from the following:    Height as of this encounter: 167.6 cm (66\").    Weight as of this encounter: 72.1 kg (159 lb).            Physical Exam  Vitals and nursing note reviewed.   Constitutional:       Appearance: Normal appearance. She is normal weight.   HENT:      Head: Normocephalic.      Right Ear: External ear normal.      Left Ear: External ear normal.      Nose: Nose normal.   Eyes:      Pupils: Pupils are equal, round, and reactive to light.   Neck:      Vascular: No carotid " bruit.   Cardiovascular:      Rate and Rhythm: Normal rate and regular rhythm.      Pulses: Normal pulses.      Heart sounds: Normal heart sounds.   Pulmonary:      Effort: Pulmonary effort is normal.      Breath sounds: Normal breath sounds.   Musculoskeletal:      Cervical back: Normal range of motion and neck supple.   Skin:     General: Skin is warm and dry.   Neurological:      Mental Status: She is alert. Mental status is at baseline.   Psychiatric:         Mood and Affect: Mood normal.          Procedures     Assessment and Plan     1. Type 2 diabetes mellitus with diabetic neuropathy, unspecified whether long term insulin use    - POC Microalbumin  - POC Glycosylated Hemoglobin (Hb A1C)    2. Abnormal urine  Leukocytes trace  - POC Urinalysis Dipstick, Automated    3. Type 2 diabetes mellitus with stage 3b chronic kidney disease, with long-term current use of insulin    - glimepiride (AMARYL) 2 MG tablet; Take 1 to 3 tablets po qd for diabetes  Dispense: 270 tablet; Refill: 1  - metFORMIN (GLUCOPHAGE) 500 MG tablet; Take 1 tablet by mouth Daily With Breakfast.  Dispense: 90 tablet; Refill: 1    4. Rectal bleeding    - pioglitazone (ACTOS) 30 MG tablet; Take 1 tablet by mouth Daily.  Dispense: 90 tablet; Refill: 1    5. Primary hypertension    - metoprolol succinate XL (TOPROL-XL) 50 MG 24 hr tablet; Take 1 tablet by mouth Daily.  Dispense: 90 tablet; Refill: 3    6. Gastroesophageal reflux disease without esophagitis  Well-controlled  - pantoprazole (PROTONIX) 40 MG EC tablet; Take 1 tablet by mouth Daily.  Dispense: 90 tablet; Refill: 3    7. Age-related osteoporosis without current pathological fracture  Been done October 22.  Scheduled  - DEXA Bone Density Axial; Future       Follow Up  Return in about 6 months (around 6/11/2024).    Cale WILSON [facility]  [address] [facility] [address] [phone]

## 2024-01-03 ENCOUNTER — TELEPHONE (OUTPATIENT)
Dept: FAMILY MEDICINE CLINIC | Facility: CLINIC | Age: 84
End: 2024-01-03
Payer: MEDICARE

## 2024-01-03 NOTE — TELEPHONE ENCOUNTER
Daughter called - said that Pt told her she had another heart attack 2 days ago. Denies any current chest pain/sob/other symptoms. Scheduled a visit, Pt refuses to go to ER. Advised to contact cardiology, and go to ER, as they would be able to do correct testing. Can clinical call to advise ER visit or can Dr Pearson work this patient in sooner?    Fidelina states Pt has dementia and confuses dates, so is not sure if there is an actual issue.

## 2024-01-05 ENCOUNTER — OFFICE VISIT (OUTPATIENT)
Dept: FAMILY MEDICINE CLINIC | Facility: CLINIC | Age: 84
End: 2024-01-05
Payer: MEDICARE

## 2024-01-05 VITALS
HEIGHT: 66 IN | WEIGHT: 159 LBS | OXYGEN SATURATION: 98 % | DIASTOLIC BLOOD PRESSURE: 70 MMHG | HEART RATE: 69 BPM | SYSTOLIC BLOOD PRESSURE: 132 MMHG | BODY MASS INDEX: 25.55 KG/M2

## 2024-01-05 DIAGNOSIS — Z79.4 TYPE 2 DIABETES MELLITUS WITH STAGE 3B CHRONIC KIDNEY DISEASE, WITH LONG-TERM CURRENT USE OF INSULIN: ICD-10-CM

## 2024-01-05 DIAGNOSIS — I10 PRIMARY HYPERTENSION: ICD-10-CM

## 2024-01-05 DIAGNOSIS — E11.22 TYPE 2 DIABETES MELLITUS WITH STAGE 3B CHRONIC KIDNEY DISEASE, WITH LONG-TERM CURRENT USE OF INSULIN: ICD-10-CM

## 2024-01-05 DIAGNOSIS — E78.2 MIXED HYPERLIPIDEMIA: ICD-10-CM

## 2024-01-05 DIAGNOSIS — I25.2 HISTORY OF MYOCARDIAL INFARCTION: ICD-10-CM

## 2024-01-05 DIAGNOSIS — N39.0 CHRONIC UTI: Primary | ICD-10-CM

## 2024-01-05 DIAGNOSIS — N18.32 TYPE 2 DIABETES MELLITUS WITH STAGE 3B CHRONIC KIDNEY DISEASE, WITH LONG-TERM CURRENT USE OF INSULIN: ICD-10-CM

## 2024-01-05 LAB
BILIRUB BLD-MCNC: NEGATIVE MG/DL
CLARITY, POC: ABNORMAL
COLOR UR: YELLOW
EXPIRATION DATE: ABNORMAL
GLUCOSE UR STRIP-MCNC: ABNORMAL MG/DL
KETONES UR QL: NEGATIVE
LEUKOCYTE EST, POC: ABNORMAL
Lab: ABNORMAL
NITRITE UR-MCNC: POSITIVE MG/ML
PH UR: 6 [PH] (ref 5–8)
PROT UR STRIP-MCNC: ABNORMAL MG/DL
RBC # UR STRIP: ABNORMAL /UL
SP GR UR: 1.02 (ref 1–1.03)
UROBILINOGEN UR QL: ABNORMAL

## 2024-01-05 RX ORDER — NITROGLYCERIN 0.4 MG/1
0.4 TABLET SUBLINGUAL
Qty: 25 TABLET | Refills: 1 | Status: SHIPPED | OUTPATIENT
Start: 2024-01-05

## 2024-01-05 NOTE — PROGRESS NOTES
"    Office Note     Name: Jennyfer Portillo    : 1940     MRN: [id number]     Chief Complaint  Chest Pain (Last week. Pt told daughter she didn't want to go to er. /Pt also wants urine check for uti issue)    Subjective     History of Present Illness:  Jennyfer Portillo is a 83 y.o. female who presents today for had chest pain last week it got \"sort of severe 5 to 10 minutes from laying down going to sleep.  It hurt for up to 2 or 3 days.  Says she got nauseated break out in a cold sweat and get out of breath.  Patient has confusion and told daughter Fidelina a couple days it happened her.  Refused to go to ER    Review of Systems:   Review of Systems    Past Medical History:   Past Medical History:   Diagnosis Date    Benign essential hypertension     Bilateral hearing loss     Carotid artery disease     ANGIOPLASTY, ANOTHER STENT     Chronic obstructive lung disease     Colon polyp     Depressive disorder     Diverticulosis 2016    , IN SIGMOID COLON, INTERNAL HEMMORRHOIDS, REPEAT RECOMMENDED 5 YRSOD SEVERE    GERD (gastroesophageal reflux disease)     Glaucoma     High risk medication use     Hx of myocardial infarction     Megaloblastic anemia     Migraines     Mixed hyperlipidemia     DUE TO TYPE 2 DIABETES, W. HYPERGLYCEMIA    Obesity     Osteopenia     OF BOTH HIPS/ SPINE    Pregnancy     1962,1,1963,1958,1960    Recurrent UTI (urinary tract infection)     Type 2 diabetes mellitus     STAGE 3 CHRONIC KIDNEY DISEASE, W/O LONG  TERM USE OF CURRENT INSULIN    Vitamin D deficiency        Past Surgical History:   Past Surgical History:   Procedure Laterality Date    COLONOSCOPY  2012    MODERATLEY SEVERE DIVERTICLOSIS FOUND IN THE SIGMOID COLON AND DESCENDING COLON. RECOMMENDED IN 3 YRS    ESSURE TUBAL LIGATION  1968    BILATERAL    HYSTERECTOMY  1979    ORIF ANKLE FRACTURE Right 2005    OPEN REDUCTION INTERNAL FIXATION, SUCESSFUL OUTCOME    RETINAL DETACHMENT SURGERY  2009    REATTACHED " , LEFT    UPPER GASTROINTESTINAL ENDOSCOPY         Family History:   Family History   Problem Relation Age of Onset    Colon polyps Mother     Coronary artery disease Mother     Colon cancer Mother     Diabetes Mother     Hyperlipidemia Mother     Hypertension Mother     Peripheral vascular disease Mother     Colon polyps Sister     Colon cancer Sister     Colon cancer Sister        Social History:   Social History     Socioeconomic History    Marital status:     Number of children: 5    Highest education level: 12th grade   Tobacco Use    Smoking status: Former     Packs/day: 0.25     Years: 5.00     Additional pack years: 0.00     Total pack years: 1.25     Types: Cigarettes     Quit date:      Years since quittin.0     Passive exposure: Past    Smokeless tobacco: Never   Vaping Use    Vaping Use: Never used   Substance and Sexual Activity    Alcohol use: Not Currently    Drug use: Defer    Sexual activity: Defer       Immunizations:   Immunization History   Administered Date(s) Administered    COVID-19 (ANGEL) 2021, 2021    COVID-19 (PFIZER) BIVALENT 12+YRS 10/26/2022    COVID-19 F23 (MODERNA) 12YRS+ (SPIKEVAX) 2023    Flu Vaccine Quad PF >36MO 10/29/2014    Fluzone High-Dose 65+yrs 10/31/2023    Fluzone Quad >6mos (Multi-dose) 10/20/2020    Influenza, Unspecified 10/20/2020    Pneumococcal Conjugate 13-Valent (PCV13) 2019    Pneumococcal Polysaccharide (PPSV23) 2008    Pneumococcal, Unspecified 2008, 2019    TD Preservative Free (Tenivac) 2016    Td (TDVAX) 2016        Medications:     Current Outpatient Medications:     alendronate (FOSAMAX) 70 MG tablet, Take 1 tablet by mouth Every 7 (Seven) Days., Disp: 12 tablet, Rfl: 1    amLODIPine (NORVASC) 10 MG tablet, Take 1 tablet by mouth Daily., Disp: , Rfl:     aspirin 81 MG EC tablet, Take 1 tablet by mouth Daily., Disp: , Rfl:     brimonidine-timolol (COMBIGAN) 0.2-0.5 % ophthalmic solution,  "1 drop., Disp: , Rfl:     Combigan 0.2-0.5 % ophthalmic solution, , Disp: , Rfl:     escitalopram (LEXAPRO) 10 MG tablet, Take 1 tablet by mouth Daily., Disp: 90 tablet, Rfl: 3    glimepiride (AMARYL) 2 MG tablet, Take 1 to 3 tablets po qd for diabetes, Disp: 270 tablet, Rfl: 1    latanoprost (XALATAN) 0.005 % ophthalmic solution, , Disp: , Rfl:     metFORMIN (GLUCOPHAGE) 500 MG tablet, Take 1 tablet by mouth Daily With Breakfast., Disp: 90 tablet, Rfl: 1    metoprolol succinate XL (TOPROL-XL) 50 MG 24 hr tablet, Take 1 tablet by mouth Daily., Disp: 90 tablet, Rfl: 3    nitrofurantoin (MACRODANTIN) 100 MG capsule, , Disp: , Rfl:     pantoprazole (PROTONIX) 40 MG EC tablet, Take 1 tablet by mouth Daily., Disp: 90 tablet, Rfl: 3    pioglitazone (ACTOS) 30 MG tablet, Take 1 tablet by mouth Daily., Disp: 90 tablet, Rfl: 1    pravastatin (PRAVACHOL) 80 MG tablet, TAKE 1 TABLET BY MOUTH EVERY DAY, Disp: 90 tablet, Rfl: 2    tiZANidine (ZANAFLEX) 4 MG tablet, TAKE 1/2 TABLET BY MOUTH 2 TIMES A DAY (Patient taking differently: Take 0.5 tablets by mouth 1 (One) Time. At night), Disp: 30 tablet, Rfl: 3    valsartan (DIOVAN) 160 MG tablet, Take 1 tablet by mouth Daily., Disp: 90 tablet, Rfl: 1    nitroglycerin (Nitrostat) 0.4 MG SL tablet, Place 1 tablet under the tongue Every 5 (Five) Minutes As Needed for Chest Pain. Take no more than 3 doses in 15 minutes., Disp: 25 tablet, Rfl: 1    Allergies:   Allergies   Allergen Reactions    Atorvastatin Unknown - High Severity     Other reaction(s): Leg cramps..    Rosuvastatin Calcium Unknown - Low Severity    Sulfa Antibiotics Unknown - Low Severity       Objective     Vital Signs  /70   Pulse 69   Ht 167.6 cm (66\")   Wt 72.1 kg (159 lb)   SpO2 98%   BMI 25.66 kg/m²   Estimated body mass index is 25.66 kg/m² as calculated from the following:    Height as of this encounter: 167.6 cm (66\").    Weight as of this encounter: 72.1 kg (159 lb).            Physical Exam  Vitals " and nursing note reviewed.   Constitutional:       Appearance: Normal appearance. She is normal weight.   HENT:      Head: Normocephalic.      Right Ear: External ear normal.      Left Ear: External ear normal.      Nose: Nose normal.   Eyes:      Pupils: Pupils are equal, round, and reactive to light.   Neck:      Vascular: No carotid bruit.   Cardiovascular:      Rate and Rhythm: Normal rate and regular rhythm.      Pulses: Normal pulses.      Heart sounds: Normal heart sounds.   Pulmonary:      Effort: Pulmonary effort is normal.      Breath sounds: Normal breath sounds.   Musculoskeletal:      Cervical back: Normal range of motion and neck supple.   Skin:     General: Skin is warm and dry.   Neurological:      Mental Status: She is alert.   Psychiatric:         Mood and Affect: Mood normal.          Procedures     Assessment and Plan     1. Chronic UTI  Will wait till he is gets symptomatic, lethargy, fever chills and sweats back pain.  - POC Urinalysis Dipstick, Automated  - Urine Culture - Urine, Urine, Clean Catch    2. History of myocardial infarction  History of 2/19/1998 when she had a heart attack the echo was good a year ago    3. Primary hypertension  Controlled    4. Mixed hyperlipidemia  Controlled    5. Type 2 diabetes mellitus with stage 3b chronic kidney disease, with long-term current use of insulin  Adequately controlled A1c 6.7%.  Will have this sublingual nitroglycerin to give her       Follow Up  Return in about 3 months (around 4/5/2024).    [facility]  [address] [place] [id number] [phone]

## 2024-01-08 DIAGNOSIS — I10 PRIMARY HYPERTENSION: ICD-10-CM

## 2024-01-08 DIAGNOSIS — Z79.4 TYPE 2 DIABETES MELLITUS WITH STAGE 3B CHRONIC KIDNEY DISEASE, WITH LONG-TERM CURRENT USE OF INSULIN: ICD-10-CM

## 2024-01-08 DIAGNOSIS — E11.22 TYPE 2 DIABETES MELLITUS WITH STAGE 3B CHRONIC KIDNEY DISEASE, WITH LONG-TERM CURRENT USE OF INSULIN: ICD-10-CM

## 2024-01-08 DIAGNOSIS — K21.9 GASTROESOPHAGEAL REFLUX DISEASE WITHOUT ESOPHAGITIS: ICD-10-CM

## 2024-01-08 DIAGNOSIS — N18.32 TYPE 2 DIABETES MELLITUS WITH STAGE 3B CHRONIC KIDNEY DISEASE, WITH LONG-TERM CURRENT USE OF INSULIN: ICD-10-CM

## 2024-01-08 RX ORDER — PANTOPRAZOLE SODIUM 40 MG/1
40 TABLET, DELAYED RELEASE ORAL DAILY
Qty: 90 TABLET | Refills: 3 | OUTPATIENT
Start: 2024-01-08

## 2024-01-08 RX ORDER — METOPROLOL SUCCINATE 50 MG/1
50 TABLET, EXTENDED RELEASE ORAL DAILY
Qty: 90 TABLET | Refills: 3 | OUTPATIENT
Start: 2024-01-08

## 2024-01-08 RX ORDER — ESCITALOPRAM OXALATE 10 MG/1
10 TABLET ORAL DAILY
Qty: 90 TABLET | Refills: 3 | OUTPATIENT
Start: 2024-01-08

## 2024-01-08 RX ORDER — VALSARTAN 160 MG/1
160 TABLET ORAL DAILY
Qty: 90 TABLET | Refills: 1 | OUTPATIENT
Start: 2024-01-08

## 2024-01-08 NOTE — TELEPHONE ENCOUNTER
Caller: DHRUV WILSON    Relationship: Emergency Contact    Best call back number: [phone]     Requested Prescriptions:   Requested Prescriptions     Pending Prescriptions Disp Refills    metFORMIN (GLUCOPHAGE) 500 MG tablet 90 tablet 1     Sig: Take 1 tablet by mouth Daily With Breakfast.    pantoprazole (PROTONIX) 40 MG EC tablet 90 tablet 3     Sig: Take 1 tablet by mouth Daily.    escitalopram (LEXAPRO) 10 MG tablet 90 tablet 3     Sig: Take 1 tablet by mouth Daily.    metoprolol succinate XL (TOPROL-XL) 50 MG 24 hr tablet 90 tablet 3     Sig: Take 1 tablet by mouth Daily.    valsartan (DIOVAN) 160 MG tablet 90 tablet 1     Sig: Take 1 tablet by mouth Daily.        Pharmacy where request should be sent: [facility]  [address] [phone] [facility] [phone]      Last office visit with prescribing clinician: 1/5/2024   Last telemedicine visit with prescribing clinician: Visit date not found   Next office visit with prescribing clinician: Visit date not found     Additional details provided by patient: PATIENT OUT OF MEDICATION.      Does the patient have less than a 3 day supply:  [x] Yes  [] No    Would you like a call back once the refill request has been completed: [] Yes [x] No    If the office needs to give you a call back, can they leave a voicemail: [] Yes [x] No    Alexandra Rodriguez   01/08/24 11:28 EST

## 2024-01-10 LAB
BACTERIA UR CULT: ABNORMAL
OTHER ANTIBIOTIC SUSC ISLT: ABNORMAL

## 2024-01-10 RX ORDER — NITROFURANTOIN MACROCRYSTALS 100 MG/1
100 CAPSULE ORAL 2 TIMES DAILY WITH MEALS
Qty: 20 CAPSULE | Refills: 0 | Status: SHIPPED | OUTPATIENT
Start: 2024-01-10

## 2024-02-09 ENCOUNTER — TELEPHONE (OUTPATIENT)
Dept: FAMILY MEDICINE CLINIC | Facility: CLINIC | Age: 84
End: 2024-02-09
Payer: MEDICARE

## 2024-02-22 ENCOUNTER — CLINICAL SUPPORT (OUTPATIENT)
Dept: FAMILY MEDICINE CLINIC | Facility: CLINIC | Age: 84
End: 2024-02-22
Payer: MEDICARE

## 2024-02-22 DIAGNOSIS — M85.88 OSTEOPENIA OF SPINE: Primary | ICD-10-CM

## 2024-02-26 NOTE — TELEPHONE ENCOUNTER
Caller: STEVEDHRUV    Relationship: Emergency Contact    Best call back number: [phone]     Requested Prescriptions:   Requested Prescriptions     Pending Prescriptions Disp Refills    amLODIPine (NORVASC) 10 MG tablet       Sig: Take 1 tablet by mouth Daily.        Pharmacy where request should be sent: [facility] [address] [facility] [phone] [facility] [phone]      Last office visit with prescribing clinician: 1/5/2024   Last telemedicine visit with prescribing clinician: Visit date not found   Next office visit with prescribing clinician: Visit date not found     Additional details provided by patient: NO MEDICATION ON HAND     Does the patient have less than a 3 day supply:  [x] Yes  [] No    Would you like a call back once the refill request has been completed: [] Yes [] No    If the office needs to give you a call back, can they leave a voicemail: [] Yes [] No    Alisha Recio   02/26/24 12:24 EST

## 2024-02-26 NOTE — TELEPHONE ENCOUNTER
Lvm for pt or pts daughter to call back to let us know if she needs the amlodipine 10 mg refilled since she is on valsarten.  This med hasn't been filled in a year.

## 2024-02-28 RX ORDER — AMLODIPINE BESYLATE 10 MG/1
10 TABLET ORAL DAILY
Qty: 30 TABLET | Refills: 0 | Status: SHIPPED | OUTPATIENT
Start: 2024-02-28

## 2024-03-11 ENCOUNTER — OFFICE VISIT (OUTPATIENT)
Dept: FAMILY MEDICINE CLINIC | Facility: CLINIC | Age: 84
End: 2024-03-11
Payer: MEDICARE

## 2024-03-11 VITALS
HEIGHT: 66 IN | DIASTOLIC BLOOD PRESSURE: 78 MMHG | WEIGHT: 160 LBS | BODY MASS INDEX: 25.71 KG/M2 | SYSTOLIC BLOOD PRESSURE: 132 MMHG | OXYGEN SATURATION: 98 % | HEART RATE: 70 BPM

## 2024-03-11 DIAGNOSIS — N39.0 CHRONIC UTI: ICD-10-CM

## 2024-03-11 DIAGNOSIS — N18.32 TYPE 2 DIABETES MELLITUS WITH STAGE 3B CHRONIC KIDNEY DISEASE, WITH LONG-TERM CURRENT USE OF INSULIN: ICD-10-CM

## 2024-03-11 DIAGNOSIS — E55.9 VITAMIN D DEFICIENCY: ICD-10-CM

## 2024-03-11 DIAGNOSIS — D53.1 MEGALOBLASTIC ANEMIA: ICD-10-CM

## 2024-03-11 DIAGNOSIS — F32.A DEPRESSIVE DISORDER: ICD-10-CM

## 2024-03-11 DIAGNOSIS — Z79.4 TYPE 2 DIABETES MELLITUS WITH STAGE 3B CHRONIC KIDNEY DISEASE, WITH LONG-TERM CURRENT USE OF INSULIN: ICD-10-CM

## 2024-03-11 DIAGNOSIS — K62.5 RECTAL BLEEDING: ICD-10-CM

## 2024-03-11 DIAGNOSIS — F03.B0 MODERATE DEMENTIA, UNSPECIFIED DEMENTIA TYPE, UNSPECIFIED WHETHER BEHAVIORAL, PSYCHOTIC, OR MOOD DISTURBANCE OR ANXIETY: Primary | ICD-10-CM

## 2024-03-11 DIAGNOSIS — E78.2 MIXED HYPERLIPIDEMIA: ICD-10-CM

## 2024-03-11 DIAGNOSIS — I10 BENIGN ESSENTIAL HYPERTENSION: ICD-10-CM

## 2024-03-11 DIAGNOSIS — E11.22 TYPE 2 DIABETES MELLITUS WITH STAGE 3B CHRONIC KIDNEY DISEASE, WITH LONG-TERM CURRENT USE OF INSULIN: ICD-10-CM

## 2024-03-11 LAB
BILIRUB BLD-MCNC: NEGATIVE MG/DL
CLARITY, POC: CLEAR
COLOR UR: YELLOW
EXPIRATION DATE: ABNORMAL
EXPIRATION DATE: ABNORMAL
GLUCOSE UR STRIP-MCNC: NEGATIVE MG/DL
KETONES UR QL: NEGATIVE
LEUKOCYTE EST, POC: ABNORMAL
Lab: ABNORMAL
Lab: ABNORMAL
NITRITE UR-MCNC: NEGATIVE MG/ML
PH UR: 7 [PH] (ref 5–8)
POC CREATININE URINE: 200
POC MICROALBUMIN URINE: 80
PROT UR STRIP-MCNC: NEGATIVE MG/DL
RBC # UR STRIP: NEGATIVE /UL
SP GR UR: 1.02 (ref 1–1.03)
UROBILINOGEN UR QL: ABNORMAL

## 2024-03-11 RX ORDER — NITROFURANTOIN MACROCRYSTALS 100 MG/1
100 CAPSULE ORAL 2 TIMES DAILY WITH MEALS
Qty: 20 CAPSULE | Refills: 0 | Status: CANCELLED | OUTPATIENT
Start: 2024-03-11

## 2024-03-11 RX ORDER — DONEPEZIL HYDROCHLORIDE 10 MG/1
10 TABLET, FILM COATED ORAL NIGHTLY
Qty: 30 TABLET | Refills: 1 | Status: SHIPPED | OUTPATIENT
Start: 2024-03-11

## 2024-03-11 RX ORDER — METHENAMINE HIPPURATE 1000 MG/1
1 TABLET ORAL 2 TIMES DAILY WITH MEALS
Qty: 180 TABLET | Refills: 1 | Status: SHIPPED | OUTPATIENT
Start: 2024-03-11

## 2024-03-11 RX ORDER — PIOGLITAZONEHYDROCHLORIDE 30 MG/1
30 TABLET ORAL DAILY
Qty: 90 TABLET | Refills: 1 | Status: SHIPPED | OUTPATIENT
Start: 2024-03-11

## 2024-03-11 NOTE — PROGRESS NOTES
"    Office Note     Name: Jennyfer Portillo    : 1940     MRN: [id number]     Chief Complaint  Dementia    Subjective     History of Present Illness:  Jennyfer Portillo is a 84 y.o. female who presents today for she is sort of failed the preemptive test.  She understood today to be , it being on Monday.  Subtracted 100 manage 7 to be 89 and that is all she got did not remember 3 objects \"ladder, horse, turtle.\"Forward by 3-6-9-12-etc ,  left out d-l-r-[id number]  the r. Did a clock her dtr. Thinks she seeing thing gets to be ers.    Review of Systems:   Review of Systems    Past Medical History:   Past Medical History:   Diagnosis Date    Benign essential hypertension     Bilateral hearing loss     Carotid artery disease     ANGIOPLASTY, ANOTHER STENT     Chronic obstructive lung disease     Colon polyp     Depressive disorder     Diverticulosis 2016    , IN SIGMOID COLON, INTERNAL HEMMORRHOIDS, REPEAT RECOMMENDED 5 YRSOD SEVERE    GERD (gastroesophageal reflux disease)     Glaucoma     High risk medication use     Hx of myocardial infarction     Megaloblastic anemia     Migraines     Mixed hyperlipidemia     DUE TO TYPE 2 DIABETES, W. HYPERGLYCEMIA    Obesity     Osteopenia     OF BOTH HIPS/ SPINE    Pregnancy     2,1,1963,1958,1960    Recurrent UTI (urinary tract infection)     Type 2 diabetes mellitus     STAGE 3 CHRONIC KIDNEY DISEASE, W/O LONG  TERM USE OF CURRENT INSULIN    Vitamin D deficiency        Past Surgical History:   Past Surgical History:   Procedure Laterality Date    COLONOSCOPY  2012    MODERATLEY SEVERE DIVERTICLOSIS FOUND IN THE SIGMOID COLON AND DESCENDING COLON. RECOMMENDED IN 3 YRS    ESSURE TUBAL LIGATION  1968    BILATERAL    HYSTERECTOMY      ORIF ANKLE FRACTURE Right 2005    OPEN REDUCTION INTERNAL FIXATION, SUCESSFUL OUTCOME    RETINAL DETACHMENT SURGERY  2009    REATTACHED , LEFT    UPPER GASTROINTESTINAL ENDOSCOPY         Family History: "   Family History   Problem Relation Age of Onset    Colon polyps Mother     Coronary artery disease Mother     Colon cancer Mother     Diabetes Mother     Hyperlipidemia Mother     Hypertension Mother     Peripheral vascular disease Mother     Colon polyps Sister     Colon cancer Sister     Colon cancer Sister        Social History:   Social History     Socioeconomic History    Marital status:     Number of children: 5    Highest education level: 12th grade   Tobacco Use    Smoking status: Former     Current packs/day: 0.00     Average packs/day: 0.3 packs/day for 5.0 years (1.3 ttl pk-yrs)     Types: Cigarettes     Start date:      Quit date:      Years since quittin.2     Passive exposure: Past    Smokeless tobacco: Never   Vaping Use    Vaping status: Never Used   Substance and Sexual Activity    Alcohol use: Not Currently    Drug use: Defer    Sexual activity: Defer       Immunizations:   Immunization History   Administered Date(s) Administered    COVID-19 (ANGEL) 2021, 2021    COVID-19 (PFIZER) BIVALENT 12+YRS 10/26/2022    COVID-19 F23 (MODERNA) 12YRS+ (SPIKEVAX) 2023    Flu Vaccine Quad PF >36MO 10/29/2014    Fluzone High-Dose 65+yrs 10/31/2023    Fluzone Quad >6mos (Multi-dose) 10/20/2020    Influenza, Unspecified 10/20/2020    Pneumococcal Conjugate 13-Valent (PCV13) 2019    Pneumococcal Polysaccharide (PPSV23) 2008    Pneumococcal, Unspecified 2008, 2019    TD Preservative Free (Tenivac) 2016    Td (TDVAX) 2016        Medications:     Current Outpatient Medications:     amLODIPine (NORVASC) 10 MG tablet, Take 1 tablet by mouth Daily., Disp: 30 tablet, Rfl: 0    aspirin 81 MG EC tablet, Take 1 tablet by mouth Daily., Disp: , Rfl:     brimonidine-timolol (COMBIGAN) 0.2-0.5 % ophthalmic solution, 1 drop., Disp: , Rfl:     Combigan 0.2-0.5 % ophthalmic solution, , Disp: , Rfl:     escitalopram (LEXAPRO) 10 MG tablet, Take 1 tablet by  mouth Daily., Disp: 90 tablet, Rfl: 3    glimepiride (AMARYL) 2 MG tablet, Take 1 to 3 tablets po qd for diabetes, Disp: 270 tablet, Rfl: 1    latanoprost (XALATAN) 0.005 % ophthalmic solution, , Disp: , Rfl:     metFORMIN (GLUCOPHAGE) 500 MG tablet, Take 1 tablet by mouth Daily With Breakfast., Disp: 90 tablet, Rfl: 1    metoprolol succinate XL (TOPROL-XL) 50 MG 24 hr tablet, Take 1 tablet by mouth Daily., Disp: 90 tablet, Rfl: 3    nitrofurantoin (MACRODANTIN) 100 MG capsule, Take 1 capsule by mouth 2 (Two) Times a Day With Meals., Disp: 20 capsule, Rfl: 0    nitroglycerin (Nitrostat) 0.4 MG SL tablet, Place 1 tablet under the tongue Every 5 (Five) Minutes As Needed for Chest Pain. Take no more than 3 doses in 15 minutes., Disp: 25 tablet, Rfl: 1    pantoprazole (PROTONIX) 40 MG EC tablet, Take 1 tablet by mouth Daily., Disp: 90 tablet, Rfl: 3    pioglitazone (ACTOS) 30 MG tablet, Take 1 tablet by mouth Daily., Disp: 90 tablet, Rfl: 1    pravastatin (PRAVACHOL) 80 MG tablet, TAKE 1 TABLET BY MOUTH EVERY DAY, Disp: 90 tablet, Rfl: 2    tiZANidine (ZANAFLEX) 4 MG tablet, TAKE 1/2 TABLET BY MOUTH 2 TIMES A DAY (Patient taking differently: Take 0.5 tablets by mouth 1 (One) Time. At night), Disp: 30 tablet, Rfl: 3    valsartan (DIOVAN) 160 MG tablet, Take 1 tablet by mouth Daily., Disp: 90 tablet, Rfl: 1    denosumab (PROLIA) 60 MG/ML solution prefilled syringe syringe, Inject 1 mL under the skin into the appropriate area as directed 1 (One) Time for 1 dose. Repeat in 6 months1, Disp: 1 mL, Rfl: 0    donepezil (Aricept) 10 MG tablet, Take 1 tablet by mouth Every Night., Disp: 30 tablet, Rfl: 1    methenamine (HIPREX) 1 g tablet, Take 1 tablet by mouth 2 (Two) Times a Day With Meals., Disp: 180 tablet, Rfl: 1    Allergies:   Allergies   Allergen Reactions    Ciprofloxacin Anxiety and Rash    Atorvastatin Unknown - High Severity     Other reaction(s): Leg cramps..    Rosuvastatin Calcium Unknown - Low Severity    Sulfa  "Antibiotics Unknown - Low Severity       Objective     Vital Signs  /78   Pulse 70   Ht 167.6 cm (66\")   Wt 72.6 kg (160 lb)   SpO2 98%   BMI 25.82 kg/m²   Estimated body mass index is 25.82 kg/m² as calculated from the following:    Height as of this encounter: 167.6 cm (66\").    Weight as of this encounter: 72.6 kg (160 lb).            Physical Exam  Constitutional:       General: She is not in acute distress.     Appearance: Normal appearance. She is obese. She is not toxic-appearing or diaphoretic.   HENT:      Head: Normocephalic and atraumatic.      Right Ear: External ear normal.      Left Ear: External ear normal.      Nose: Nose normal.   Eyes:      Pupils: Pupils are equal, round, and reactive to light.   Skin:     General: Skin is warm and dry.   Neurological:      Mental Status: She is alert.   Psychiatric:         Mood and Affect: Mood normal.         Behavior: Behavior normal.        Procedures     Assessment and Plan     1. Rectal bleeding    - pioglitazone (ACTOS) 30 MG tablet; Take 1 tablet by mouth Daily.  Dispense: 90 tablet; Refill: 1    2. Moderate dementia, unspecified dementia type, unspecified whether behavioral, psychotic, or mood disturbance or anxiety  Will let the patient take Aricept 10 mg at night check in 6 weeks    3. Benign essential hypertension  Controlled    4. Mixed hyperlipidemia  Well-controlled    5. Type 2 diabetes mellitus with stage 3b chronic kidney disease, with long-term current use of insulin  Well-controlled    6. Vitamin D deficiency  Well-controlled as have the daughter 6 and weekly pills    7. Chronic UTI  Methenamine hippurate refilled by Dr. Bhakta    8. Depressive disorder  Controlled    9. Megaloblastic anemia  Spent 45 minutes evaluating patient       Follow Up  Return in about 6 weeks (around 4/22/2024).    Cale WILSON [facility]  [address] [facility] " [id number] [phone]

## 2024-03-14 LAB
BACTERIA UR CULT: ABNORMAL
BACTERIA UR CULT: ABNORMAL
OTHER ANTIBIOTIC SUSC ISLT: ABNORMAL

## 2024-03-15 RX ORDER — NITROFURANTOIN MACROCRYSTALS 100 MG/1
100 CAPSULE ORAL 2 TIMES DAILY WITH MEALS
Qty: 20 CAPSULE | Refills: 0 | Status: SHIPPED | OUTPATIENT
Start: 2024-03-15

## 2024-04-23 ENCOUNTER — OFFICE VISIT (OUTPATIENT)
Dept: FAMILY MEDICINE CLINIC | Facility: CLINIC | Age: 84
End: 2024-04-23
Payer: MEDICARE

## 2024-04-23 VITALS
HEART RATE: 61 BPM | BODY MASS INDEX: 25.71 KG/M2 | HEIGHT: 66 IN | SYSTOLIC BLOOD PRESSURE: 122 MMHG | DIASTOLIC BLOOD PRESSURE: 82 MMHG | WEIGHT: 160 LBS | OXYGEN SATURATION: 97 %

## 2024-04-23 DIAGNOSIS — N39.0 CHRONIC UTI: Primary | ICD-10-CM

## 2024-04-23 DIAGNOSIS — I10 PRIMARY HYPERTENSION: ICD-10-CM

## 2024-04-23 DIAGNOSIS — E78.3 MIXED HYPERGLYCERIDEMIA: ICD-10-CM

## 2024-04-23 DIAGNOSIS — Z79.4 TYPE 2 DIABETES MELLITUS WITH STAGE 3B CHRONIC KIDNEY DISEASE, WITH LONG-TERM CURRENT USE OF INSULIN: ICD-10-CM

## 2024-04-23 DIAGNOSIS — D53.1 MEGALOBLASTIC ANEMIA: ICD-10-CM

## 2024-04-23 DIAGNOSIS — E55.9 VITAMIN D DEFICIENCY: ICD-10-CM

## 2024-04-23 DIAGNOSIS — M79.672 LEFT FOOT PAIN: ICD-10-CM

## 2024-04-23 DIAGNOSIS — E78.2 MIXED HYPERLIPIDEMIA: ICD-10-CM

## 2024-04-23 DIAGNOSIS — E11.22 TYPE 2 DIABETES MELLITUS WITH STAGE 3B CHRONIC KIDNEY DISEASE, WITH LONG-TERM CURRENT USE OF INSULIN: ICD-10-CM

## 2024-04-23 DIAGNOSIS — N18.32 TYPE 2 DIABETES MELLITUS WITH STAGE 3B CHRONIC KIDNEY DISEASE, WITH LONG-TERM CURRENT USE OF INSULIN: ICD-10-CM

## 2024-04-23 DIAGNOSIS — R53.83 OTHER FATIGUE: ICD-10-CM

## 2024-04-23 LAB
BILIRUB BLD-MCNC: NEGATIVE MG/DL
CLARITY, POC: CLEAR
COLOR UR: YELLOW
EXPIRATION DATE: NORMAL
GLUCOSE UR STRIP-MCNC: NEGATIVE MG/DL
KETONES UR QL: NEGATIVE
LEUKOCYTE EST, POC: NEGATIVE
Lab: NORMAL
NITRITE UR-MCNC: NEGATIVE MG/ML
PH UR: 5.5 [PH] (ref 5–8)
PROT UR STRIP-MCNC: NEGATIVE MG/DL
RBC # UR STRIP: NEGATIVE /UL
SP GR UR: 1.02 (ref 1–1.03)
UROBILINOGEN UR QL: NORMAL

## 2024-04-23 PROCEDURE — 3074F SYST BP LT 130 MM HG: CPT | Performed by: FAMILY MEDICINE

## 2024-04-23 PROCEDURE — 3079F DIAST BP 80-89 MM HG: CPT | Performed by: FAMILY MEDICINE

## 2024-04-23 PROCEDURE — 99214 OFFICE O/P EST MOD 30 MIN: CPT | Performed by: FAMILY MEDICINE

## 2024-04-23 PROCEDURE — 1170F FXNL STATUS ASSESSED: CPT | Performed by: FAMILY MEDICINE

## 2024-04-23 PROCEDURE — 81003 URINALYSIS AUTO W/O SCOPE: CPT | Performed by: FAMILY MEDICINE

## 2024-04-23 PROCEDURE — 1159F MED LIST DOCD IN RCRD: CPT | Performed by: FAMILY MEDICINE

## 2024-04-23 PROCEDURE — 1160F RVW MEDS BY RX/DR IN RCRD: CPT | Performed by: FAMILY MEDICINE

## 2024-04-23 NOTE — PROGRESS NOTES
Office Note     Name: Jennyfer Portillo    : 1940     MRN: [id number]     Chief Complaint  Medicare Wellness-subsequent (physical)    Subjective     History of Present Illness:  Jennyfer Portillo is a 84 y.o. female who presents today for Medicare wellness not done.  He comes in hypertension diabetes high cholesterol and dementia she.  But she had herself taken off Norvasc 10 mg and the pressures been controlled.  Done Paxil caused nightmares and they quit taking it.  Daughter reminds me having left to her.  Has not been able to  this evening last week with some neighbors yard looking for a cat    Review of Systems:   Review of Systems    Past Medical History:   Past Medical History:   Diagnosis Date    Benign essential hypertension     Bilateral hearing loss     Carotid artery disease     ANGIOPLASTY, ANOTHER STENT     Chronic obstructive lung disease     Colon polyp     Depressive disorder     Diverticulosis 2016    , IN SIGMOID COLON, INTERNAL HEMMORRHOIDS, REPEAT RECOMMENDED 5 YRSOD SEVERE    GERD (gastroesophageal reflux disease)     Glaucoma     High risk medication use     Hx of myocardial infarction     Megaloblastic anemia     Migraines     Mixed hyperlipidemia     DUE TO TYPE 2 DIABETES, W. HYPERGLYCEMIA    Obesity     Osteopenia     OF BOTH HIPS/ SPINE    Pregnancy     1962,1961,1963,1958,1960    Recurrent UTI (urinary tract infection)     Type 2 diabetes mellitus     STAGE 3 CHRONIC KIDNEY DISEASE, W/O LONG  TERM USE OF CURRENT INSULIN    Vitamin D deficiency        Past Surgical History:   Past Surgical History:   Procedure Laterality Date    COLONOSCOPY  2012    MODERATLEY SEVERE DIVERTICLOSIS FOUND IN THE SIGMOID COLON AND DESCENDING COLON. RECOMMENDED IN 3 YRS    ESSURE TUBAL LIGATION  1968    BILATERAL    HYSTERECTOMY  1979    ORIF ANKLE FRACTURE Right     OPEN REDUCTION INTERNAL FIXATION, SUCESSFUL OUTCOME    RETINAL DETACHMENT SURGERY  2009    REATTACHED , LEFT     UPPER GASTROINTESTINAL ENDOSCOPY         Family History:   Family History   Problem Relation Age of Onset    Colon polyps Mother     Coronary artery disease Mother     Colon cancer Mother     Diabetes Mother     Hyperlipidemia Mother     Hypertension Mother     Peripheral vascular disease Mother     Colon polyps Sister     Colon cancer Sister     Colon cancer Sister        Social History:   Social History     Socioeconomic History    Marital status:     Number of children: 5    Highest education level: 12th grade   Tobacco Use    Smoking status: Former     Current packs/day: 0.00     Average packs/day: 0.3 packs/day for 5.0 years (1.3 ttl pk-yrs)     Types: Cigarettes     Start date:      Quit date:      Years since quittin.3     Passive exposure: Past    Smokeless tobacco: Never   Vaping Use    Vaping status: Never Used   Substance and Sexual Activity    Alcohol use: Not Currently    Drug use: Defer    Sexual activity: Defer       Immunizations:   Immunization History   Administered Date(s) Administered    COVID-19 (ANGEL) 2021, 2021    COVID-19 (PFIZER) BIVALENT 12+YRS 10/26/2022    COVID-19 F23 (MODERNA) 12YRS+ (SPIKEVAX) 2023    Flu Vaccine Quad PF >36MO 10/29/2014    Fluzone High-Dose 65+yrs 10/31/2023    Fluzone Quad >6mos (Multi-dose) 10/20/2020    Influenza, Unspecified 10/20/2020    Pneumococcal Conjugate 13-Valent (PCV13) 2019    Pneumococcal Polysaccharide (PPSV23) 2008    Pneumococcal, Unspecified 2008, 2019    TD Preservative Free (Tenivac) 2016    Td (TDVAX) 2016        Medications:     Current Outpatient Medications:     aspirin 81 MG EC tablet, Take 1 tablet by mouth Daily., Disp: , Rfl:     brimonidine-timolol (COMBIGAN) 0.2-0.5 % ophthalmic solution, 1 drop., Disp: , Rfl:     Combigan 0.2-0.5 % ophthalmic solution, , Disp: , Rfl:     escitalopram (LEXAPRO) 10 MG tablet, Take 1 tablet by mouth Daily., Disp: 90 tablet, Rfl:  "3    glimepiride (AMARYL) 2 MG tablet, Take 1 to 3 tablets po qd for diabetes, Disp: 270 tablet, Rfl: 1    latanoprost (XALATAN) 0.005 % ophthalmic solution, , Disp: , Rfl:     metFORMIN (GLUCOPHAGE) 500 MG tablet, Take 1 tablet by mouth Daily With Breakfast., Disp: 90 tablet, Rfl: 1    methenamine (HIPREX) 1 g tablet, Take 1 tablet by mouth 2 (Two) Times a Day With Meals., Disp: 180 tablet, Rfl: 1    metoprolol succinate XL (TOPROL-XL) 50 MG 24 hr tablet, Take 1 tablet by mouth Daily., Disp: 90 tablet, Rfl: 3    nitrofurantoin (MACRODANTIN) 100 MG capsule, Take 1 capsule by mouth 2 (Two) Times a Day With Meals., Disp: 20 capsule, Rfl: 0    nitroglycerin (Nitrostat) 0.4 MG SL tablet, Place 1 tablet under the tongue Every 5 (Five) Minutes As Needed for Chest Pain. Take no more than 3 doses in 15 minutes., Disp: 25 tablet, Rfl: 1    pantoprazole (PROTONIX) 40 MG EC tablet, Take 1 tablet by mouth Daily., Disp: 90 tablet, Rfl: 3    pioglitazone (ACTOS) 30 MG tablet, Take 1 tablet by mouth Daily., Disp: 90 tablet, Rfl: 1    pravastatin (PRAVACHOL) 80 MG tablet, TAKE 1 TABLET BY MOUTH EVERY DAY, Disp: 90 tablet, Rfl: 2    tiZANidine (ZANAFLEX) 4 MG tablet, TAKE 1/2 TABLET BY MOUTH 2 TIMES A DAY (Patient taking differently: Take 0.5 tablets by mouth 1 (One) Time. At night), Disp: 30 tablet, Rfl: 3    valsartan (DIOVAN) 160 MG tablet, Take 1 tablet by mouth Daily., Disp: 90 tablet, Rfl: 1    denosumab (PROLIA) 60 MG/ML solution prefilled syringe syringe, Inject 1 mL under the skin into the appropriate area as directed 1 (One) Time for 1 dose. Repeat in 6 months1, Disp: 1 mL, Rfl: 0    Allergies:   Allergies   Allergen Reactions    Ciprofloxacin Anxiety and Rash    Atorvastatin Unknown - High Severity     Other reaction(s): Leg cramps..    Rosuvastatin Calcium Unknown - Low Severity    Sulfa Antibiotics Unknown - Low Severity       Objective     Vital Signs  /82   Pulse 61   Ht 167.6 cm (66\")   Wt 72.6 kg (160 lb)  " " SpO2 97%   BMI 25.82 kg/m²   Estimated body mass index is 25.82 kg/m² as calculated from the following:    Height as of this encounter: 167.6 cm (66\").    Weight as of this encounter: 72.6 kg (160 lb).            Physical Exam  Vitals and nursing note reviewed.   Constitutional:       Appearance: Normal appearance. She is normal weight.   HENT:      Head: Normocephalic.      Right Ear: External ear normal.      Left Ear: External ear normal.      Nose: Nose normal.   Eyes:      Pupils: Pupils are equal, round, and reactive to light.   Neck:      Vascular: No carotid bruit.   Cardiovascular:      Rate and Rhythm: Normal rate and regular rhythm.      Pulses: Normal pulses.      Heart sounds: Normal heart sounds.   Pulmonary:      Effort: Pulmonary effort is normal.      Breath sounds: Normal breath sounds.   Musculoskeletal:      Cervical back: Normal range of motion and neck supple.   Skin:     General: Skin is warm and dry.   Neurological:      Mental Status: She is alert.   Psychiatric:         Mood and Affect: Mood normal.          Procedures     Assessment and Plan     1. Chronic UTI  UA pending    2. Type 2 diabetes mellitus with stage 3b chronic kidney disease, with long-term current use of insulin  Controlled  - Hemoglobin A1c    3. Primary hypertension  Evidently controlled  - Comprehensive Metabolic Panel    4. Vitamin D deficiency    - Vitamin D,25-Hydroxy    5. Megaloblastic anemia    - Vitamin B12 & Folate    6. Other fatigue    - TSH  - CBC & Differential    7. Mixed hyperglyceridemia      8. Mixed hyperlipidemia  Controlled  - Lipid Panel    9. Left foot pain  X-ray to make sure she did not injure it  - XR Foot 3+ View Left       Follow Up  Return in about 4 months (around 8/23/2024).    [facility]  [address] [place] [id number] [phone]  "

## 2024-04-24 LAB
25(OH)D3+25(OH)D2 SERPL-MCNC: 30.3 NG/ML (ref 30–100)
ALBUMIN SERPL-MCNC: 4.1 G/DL (ref 3.7–4.7)
ALBUMIN/GLOB SERPL: 2.3 {RATIO} (ref 1.2–2.2)
ALP SERPL-CCNC: 37 IU/L (ref 44–121)
ALT SERPL-CCNC: 9 IU/L (ref 0–32)
AST SERPL-CCNC: 16 IU/L (ref 0–40)
BASOPHILS # BLD AUTO: 0 X10E3/UL (ref 0–0.2)
BASOPHILS NFR BLD AUTO: 1 %
BILIRUB SERPL-MCNC: 0.3 MG/DL (ref 0–1.2)
BUN SERPL-MCNC: 21 MG/DL (ref 8–27)
BUN/CREAT SERPL: 16 (ref 12–28)
CALCIUM SERPL-MCNC: 9.4 MG/DL (ref 8.7–10.3)
CHLORIDE SERPL-SCNC: 103 MMOL/L (ref 96–106)
CHOLEST SERPL-MCNC: 165 MG/DL (ref 100–199)
CO2 SERPL-SCNC: 23 MMOL/L (ref 20–29)
CREAT SERPL-MCNC: 1.3 MG/DL (ref 0.57–1)
EGFRCR SERPLBLD CKD-EPI 2021: 41 ML/MIN/1.73
EOSINOPHIL # BLD AUTO: 0.1 X10E3/UL (ref 0–0.4)
EOSINOPHIL NFR BLD AUTO: 3 %
ERYTHROCYTE [DISTWIDTH] IN BLOOD BY AUTOMATED COUNT: 12.7 % (ref 11.7–15.4)
FOLATE SERPL-MCNC: 5.4 NG/ML
GLOBULIN SER CALC-MCNC: 1.8 G/DL (ref 1.5–4.5)
GLUCOSE SERPL-MCNC: 154 MG/DL (ref 70–99)
HBA1C MFR BLD: 7.1 % (ref 4.8–5.6)
HCT VFR BLD AUTO: 32.5 % (ref 34–46.6)
HDLC SERPL-MCNC: 56 MG/DL
HGB BLD-MCNC: 10.6 G/DL (ref 11.1–15.9)
IMM GRANULOCYTES # BLD AUTO: 0 X10E3/UL (ref 0–0.1)
IMM GRANULOCYTES NFR BLD AUTO: 0 %
LDLC SERPL CALC-MCNC: 96 MG/DL (ref 0–99)
LYMPHOCYTES # BLD AUTO: 0.9 X10E3/UL (ref 0.7–3.1)
LYMPHOCYTES NFR BLD AUTO: 21 %
MCH RBC QN AUTO: 32.2 PG (ref 26.6–33)
MCHC RBC AUTO-ENTMCNC: 32.6 G/DL (ref 31.5–35.7)
MCV RBC AUTO: 99 FL (ref 79–97)
MONOCYTES # BLD AUTO: 0.3 X10E3/UL (ref 0.1–0.9)
MONOCYTES NFR BLD AUTO: 8 %
NEUTROPHILS # BLD AUTO: 3 X10E3/UL (ref 1.4–7)
NEUTROPHILS NFR BLD AUTO: 67 %
PLATELET # BLD AUTO: 200 X10E3/UL (ref 150–450)
POTASSIUM SERPL-SCNC: 4.6 MMOL/L (ref 3.5–5.2)
PROT SERPL-MCNC: 5.9 G/DL (ref 6–8.5)
RBC # BLD AUTO: 3.29 X10E6/UL (ref 3.77–5.28)
SODIUM SERPL-SCNC: 137 MMOL/L (ref 134–144)
TRIGL SERPL-MCNC: 68 MG/DL (ref 0–149)
TSH SERPL DL<=0.005 MIU/L-ACNC: 2.96 UIU/ML (ref 0.45–4.5)
VIT B12 SERPL-MCNC: 691 PG/ML (ref 232–1245)
VLDLC SERPL CALC-MCNC: 13 MG/DL (ref 5–40)
WBC # BLD AUTO: 4.4 X10E3/UL (ref 3.4–10.8)

## 2024-05-03 ENCOUNTER — TELEPHONE (OUTPATIENT)
Dept: FAMILY MEDICINE CLINIC | Facility: CLINIC | Age: 84
End: 2024-05-03

## 2024-05-03 NOTE — TELEPHONE ENCOUNTER
"  Caller: DHRUV WILSON    Relationship: Emergency Contact    Best call back number: [phone]     What medication are you requesting: SOMETHING FOR ANXIETY    What are your current symptoms: PANICY BEHAVIOR    How long have you been experiencing symptoms: 3 TO 4 DAYS    If a prescription is needed, what is your preferred pharmacy and phone number:  [facility] [place] [address] [place] [facility] - [phone]  - [phone]  [phone]     Additional notes:PT'S DAUGHTER SAID HER \"MOM IS KIND OF FREAKING OUT.\"  WOULD LIKE SOMETHING PRESCRIBED TO CALM HER.  "

## 2024-05-05 RX ORDER — HYDROXYZINE PAMOATE 25 MG/1
25 CAPSULE ORAL 3 TIMES DAILY PRN
Qty: 60 CAPSULE | Refills: 1 | Status: SHIPPED | OUTPATIENT
Start: 2024-05-05

## 2024-05-28 RX ORDER — TIZANIDINE 4 MG/1
2 TABLET ORAL 2 TIMES DAILY
Qty: 30 TABLET | Refills: 2 | Status: SHIPPED | OUTPATIENT
Start: 2024-05-28

## 2024-05-28 NOTE — TELEPHONE ENCOUNTER
Caller: DHRUV WILSON    Relationship: Emergency Contact    Best call back number:[phone]     Requested Prescriptions:   Requested Prescriptions     Pending Prescriptions Disp Refills    tiZANidine (ZANAFLEX) 4 MG tablet 30 tablet 3     Sig: Take 0.5 tablets by mouth 2 (Two) Times a Day.        Pharmacy where request should be sent: [facility] [address] [phone] [facility] [phone]      Last office visit with prescribing clinician: 4/23/2024   Last telemedicine visit with prescribing clinician: Visit date not found   Next office visit with prescribing clinician: Visit date not found     Additional details provided by patient:     Does the patient have less than a 3 day supply:  [x] Yes  [] No    Would you like a call back once the refill request has been completed: [] Yes [x] No    If the office needs to give you a call back, can they leave a voicemail: [] Yes [x] No    Alisha Guerrero Rep   05/28/24 13:44 EDT

## 2024-06-08 ENCOUNTER — NURSE TRIAGE (OUTPATIENT)
Dept: CALL CENTER | Facility: HOSPITAL | Age: 84
End: 2024-06-08
Payer: MEDICARE

## 2024-06-08 NOTE — TELEPHONE ENCOUNTER
"Caller advised that patient was Dc from [facility] on 6/6 and was given Hydralazine 10mg PO q8 for elevated BP. Caller advises that patient received medication at 4:30pm and BP was 203/95 at 7pm this evening. Attempted to have caller recheck BP again but caller advised she was not with patient at present and that her sister was at home with patient. Denies any symptoms at this time. Advised to have sister recheck patient BP and if it was >160 to take patient to ED for evaluation. Advised if patient became symptomatic to call EMS for transport instead of POV. Verbalizes understanding and is agreeable to plan.       Reason for Disposition   [1] Systolic BP  >= 160 OR Diastolic >= 100 AND [2] cardiac (e.g., breathing difficulty, chest pain) or neurologic symptoms (e.g., new-onset blurred or double vision, unsteady gait)    Additional Information   Negative: Difficult to awaken or acting confused (e.g., disoriented, slurred speech)   Negative: SEVERE difficulty breathing (e.g., struggling for each breath, speaks in single words)   Negative: [1] Weakness of the face, arm or leg on one side of the body AND [2] new-onset   Negative: [1] Numbness (i.e., loss of sensation) of the face, arm or leg on one side of the body AND [2] new-onset   Negative: [1] Chest pain lasts > 5 minutes AND [2] history of heart disease (i.e., heart attack, bypass surgery, angina, angioplasty, CHF)   Negative: [1] Chest pain AND [2] took nitrogylcerin AND [3] pain was not relieved   Negative: Sounds like a life-threatening emergency to the triager   Negative: Symptom is main concern (e.g., headache, chest pain)   Negative: Low blood pressure is main concern    Answer Assessment - Initial Assessment Questions  1. BLOOD PRESSURE: \"What is the blood pressure?\" \"Did you take at least two measurements 5 minutes apart?\"      203/95  2. ONSET: \"When did you take your blood pressure?\"      15 Min  3. HOW: \"How did you take your blood pressure?\" " "(e.g., automatic home BP monitor, visiting nurse)      Automatic   4. HISTORY: \"Do you have a history of high blood pressure?\"      Yes  5. MEDICINES: \"Are you taking any medicines for blood pressure?\" \"Have you missed any doses recently?\"      Hydralazine   6. OTHER SYMPTOMS: \"Do you have any symptoms?\" (e.g., blurred vision, chest pain, difficulty breathing, headache, weakness)      No   7. PREGNANCY: \"Is there any chance you are pregnant?\" \"When was your last menstrual period?\"      NA    Protocols used: Blood Pressure - High-ADULT-AH    "

## 2024-06-11 ENCOUNTER — TELEPHONE (OUTPATIENT)
Dept: FAMILY MEDICINE CLINIC | Facility: CLINIC | Age: 84
End: 2024-06-11

## 2024-06-11 ENCOUNTER — TELEPHONE (OUTPATIENT)
Dept: FAMILY MEDICINE CLINIC | Facility: CLINIC | Age: 84
End: 2024-06-11
Payer: MEDICARE

## 2024-06-11 NOTE — TELEPHONE ENCOUNTER
Caller: [facility]    Relationship:     Best call back number: [phone]    What is the best time to reach you: ANYTIME     Who are you requesting to speak with (clinical staff, provider,  specific staff member): CLINICAL STAFF    What was the call regarding: EDDIE, A PHYSICAL THERAPIST WITH [facility], IS CALLING TO GET VERBAL ORDERS TO SEE THE PATIENT FOR THREE VISITS TO WORK ON HER BALANCE.     Is it okay if the provider responds through MyChart: NO

## 2024-06-11 NOTE — TELEPHONE ENCOUNTER
Rich from [facility] called to let us know that pt was set up with [facility] yesterday 06/10/2024

## 2024-06-15 ENCOUNTER — OUTSIDE FACILITY SERVICE (OUTPATIENT)
Dept: FAMILY MEDICINE CLINIC | Facility: CLINIC | Age: 84
End: 2024-06-15
Payer: MEDICARE

## 2024-07-01 ENCOUNTER — OFFICE VISIT (OUTPATIENT)
Dept: FAMILY MEDICINE CLINIC | Facility: CLINIC | Age: 84
End: 2024-07-01
Payer: MEDICARE

## 2024-07-01 VITALS
DIASTOLIC BLOOD PRESSURE: 84 MMHG | BODY MASS INDEX: 23.63 KG/M2 | HEIGHT: 66 IN | OXYGEN SATURATION: 98 % | SYSTOLIC BLOOD PRESSURE: 130 MMHG | HEART RATE: 64 BPM | WEIGHT: 147 LBS

## 2024-07-01 DIAGNOSIS — E11.22 TYPE 2 DIABETES MELLITUS WITH STAGE 3B CHRONIC KIDNEY DISEASE, WITH LONG-TERM CURRENT USE OF INSULIN: ICD-10-CM

## 2024-07-01 DIAGNOSIS — N28.9 RENAL INSUFFICIENCY: Primary | ICD-10-CM

## 2024-07-01 DIAGNOSIS — N18.32 TYPE 2 DIABETES MELLITUS WITH STAGE 3B CHRONIC KIDNEY DISEASE, WITH LONG-TERM CURRENT USE OF INSULIN: ICD-10-CM

## 2024-07-01 DIAGNOSIS — F32.A DEPRESSIVE DISORDER: ICD-10-CM

## 2024-07-01 DIAGNOSIS — R79.0 LOW MAGNESIUM LEVEL: ICD-10-CM

## 2024-07-01 DIAGNOSIS — I10 BENIGN ESSENTIAL HYPERTENSION: ICD-10-CM

## 2024-07-01 DIAGNOSIS — Z79.4 TYPE 2 DIABETES MELLITUS WITH STAGE 3B CHRONIC KIDNEY DISEASE, WITH LONG-TERM CURRENT USE OF INSULIN: ICD-10-CM

## 2024-07-01 PROCEDURE — 1126F AMNT PAIN NOTED NONE PRSNT: CPT | Performed by: FAMILY MEDICINE

## 2024-07-01 PROCEDURE — 99214 OFFICE O/P EST MOD 30 MIN: CPT | Performed by: FAMILY MEDICINE

## 2024-07-01 PROCEDURE — 1159F MED LIST DOCD IN RCRD: CPT | Performed by: FAMILY MEDICINE

## 2024-07-01 PROCEDURE — 3075F SYST BP GE 130 - 139MM HG: CPT | Performed by: FAMILY MEDICINE

## 2024-07-01 PROCEDURE — 3079F DIAST BP 80-89 MM HG: CPT | Performed by: FAMILY MEDICINE

## 2024-07-01 PROCEDURE — 1160F RVW MEDS BY RX/DR IN RCRD: CPT | Performed by: FAMILY MEDICINE

## 2024-07-01 RX ORDER — QUETIAPINE FUMARATE 25 MG/1
25 TABLET, FILM COATED ORAL NIGHTLY
Qty: 30 TABLET | Refills: 1 | Status: SHIPPED | OUTPATIENT
Start: 2024-07-01

## 2024-07-01 RX ORDER — MULTIVITAMIN WITH IRON
250 TABLET ORAL NIGHTLY
Qty: 90 EACH | Refills: 1 | Status: SHIPPED | OUTPATIENT
Start: 2024-07-01

## 2024-07-01 RX ORDER — OXYBUTYNIN CHLORIDE 5 MG/1
5 TABLET ORAL 2 TIMES DAILY
Qty: 60 TABLET | Refills: 1 | Status: SHIPPED | OUTPATIENT
Start: 2024-07-01

## 2024-07-01 RX ORDER — GLIMEPIRIDE 2 MG/1
TABLET ORAL
Start: 2024-07-01 | End: 2024-07-08

## 2024-07-01 NOTE — PROGRESS NOTES
Office Note     Name: Jennyfer Portillo    : 1940     MRN: [id number]     Chief Complaint  Hospital Follow Up Visit (-3 to  /Blood sugar elevated and bp issues)    Subjective     History of Present Illness:  Jennyfer Portillo is a 84 y.o. female who presents today for weight loss, low magnesium, and failed x 2 in 1 day.  She arranged a visit to from 6/3 to 2024.  BUN and creatinine 2.19 down to 1.07 being a.m. magnesium low magnesium 1.6 did have a UTI sugar being 454.  They are taking glimepiride 2 mg twice daily, getting to stroll around and around she goes going through the close.  Trouble with incontinence, sundowning    Review of Systems:   Review of Systems    Past Medical History:   Past Medical History:   Diagnosis Date    Benign essential hypertension     Bilateral hearing loss     Carotid artery disease     ANGIOPLASTY, ANOTHER STENT     Chronic obstructive lung disease     Colon polyp     Depressive disorder     Diverticulosis 2016    , IN SIGMOID COLON, INTERNAL HEMMORRHOIDS, REPEAT RECOMMENDED 5 YRSOD SEVERE    GERD (gastroesophageal reflux disease)     Glaucoma     High risk medication use     Hx of myocardial infarction     Megaloblastic anemia     Migraines     Mixed hyperlipidemia     DUE TO TYPE 2 DIABETES, W. HYPERGLYCEMIA    Obesity     Osteopenia     OF BOTH HIPS/ SPINE    Pregnancy     1962,1961,1963,1958,1960    Recurrent UTI (urinary tract infection)     Type 2 diabetes mellitus     STAGE 3 CHRONIC KIDNEY DISEASE, W/O LONG  TERM USE OF CURRENT INSULIN    Vitamin D deficiency        Past Surgical History:   Past Surgical History:   Procedure Laterality Date    COLONOSCOPY  2012    MODERATLEY SEVERE DIVERTICLOSIS FOUND IN THE SIGMOID COLON AND DESCENDING COLON. RECOMMENDED IN 3 YRS    ESSURE TUBAL LIGATION      BILATERAL    HYSTERECTOMY      ORIF ANKLE FRACTURE Right     OPEN REDUCTION INTERNAL FIXATION, SUCESSFUL OUTCOME    RETINAL DETACHMENT SURGERY   2009    REATTACHED , LEFT    UPPER GASTROINTESTINAL ENDOSCOPY         Family History:   Family History   Problem Relation Age of Onset    Colon polyps Mother     Coronary artery disease Mother     Colon cancer Mother     Diabetes Mother     Hyperlipidemia Mother     Hypertension Mother     Peripheral vascular disease Mother     Colon polyps Sister     Colon cancer Sister     Colon cancer Sister        Social History:   Social History     Socioeconomic History    Marital status:     Number of children: 5    Highest education level: 12th grade   Tobacco Use    Smoking status: Former     Current packs/day: 0.00     Average packs/day: 0.3 packs/day for 5.0 years (1.3 ttl pk-yrs)     Types: Cigarettes     Start date:      Quit date:      Years since quittin.5     Passive exposure: Past    Smokeless tobacco: Never   Vaping Use    Vaping status: Never Used   Substance and Sexual Activity    Alcohol use: Not Currently    Drug use: Defer    Sexual activity: Defer       Immunizations:   Immunization History   Administered Date(s) Administered    COVID-19 (ANGEL) 2021, 2021    COVID-19 (PFIZER) BIVALENT 12+YRS 10/26/2022    COVID-19 F23 (MODERNA) 12YRS+ (SPIKEVAX) 2023    Flu Vaccine Quad PF >36MO 10/29/2014    Fluzone High-Dose 65+yrs 10/31/2023    Fluzone Quad >6mos (Multi-dose) 10/20/2020    Influenza, Unspecified 10/20/2020    Pneumococcal Conjugate 13-Valent (PCV13) 2019    Pneumococcal Polysaccharide (PPSV23) 2008    Pneumococcal, Unspecified 2008, 2019    TD Preservative Free (Tenivac) 2016    Td (TDVAX) 2016        Medications:     Current Outpatient Medications:     aspirin 81 MG EC tablet, Take 1 tablet by mouth Daily., Disp: , Rfl:     brimonidine-timolol (COMBIGAN) 0.2-0.5 % ophthalmic solution, 1 drop., Disp: , Rfl:     Combigan 0.2-0.5 % ophthalmic solution, , Disp: , Rfl:     escitalopram (LEXAPRO) 10 MG tablet, Take 1 tablet by  mouth Daily., Disp: 90 tablet, Rfl: 3    glimepiride (AMARYL) 2 MG tablet, Take 1 to 3 tablets po qd for diabetes, Disp: , Rfl:     hydrOXYzine pamoate (VISTARIL) 25 MG capsule, Take 1 capsule by mouth 3 (Three) Times a Day As Needed for Anxiety., Disp: 60 capsule, Rfl: 1    latanoprost (XALATAN) 0.005 % ophthalmic solution, , Disp: , Rfl:     metFORMIN (GLUCOPHAGE) 500 MG tablet, Take 1 tablet by mouth Daily With Breakfast., Disp: 90 tablet, Rfl: 1    methenamine (HIPREX) 1 g tablet, Take 1 tablet by mouth 2 (Two) Times a Day With Meals., Disp: 180 tablet, Rfl: 1    metoprolol succinate XL (TOPROL-XL) 50 MG 24 hr tablet, Take 1 tablet by mouth Daily., Disp: 90 tablet, Rfl: 3    nitrofurantoin (MACRODANTIN) 100 MG capsule, Take 1 capsule by mouth 2 (Two) Times a Day With Meals., Disp: 20 capsule, Rfl: 0    nitroglycerin (Nitrostat) 0.4 MG SL tablet, Place 1 tablet under the tongue Every 5 (Five) Minutes As Needed for Chest Pain. Take no more than 3 doses in 15 minutes., Disp: 25 tablet, Rfl: 1    pantoprazole (PROTONIX) 40 MG EC tablet, Take 1 tablet by mouth Daily., Disp: 90 tablet, Rfl: 3    pioglitazone (ACTOS) 30 MG tablet, Take 1 tablet by mouth Daily., Disp: 90 tablet, Rfl: 1    pravastatin (PRAVACHOL) 80 MG tablet, TAKE 1 TABLET BY MOUTH EVERY DAY, Disp: 90 tablet, Rfl: 2    tiZANidine (ZANAFLEX) 4 MG tablet, Take 0.5 tablets by mouth 2 (Two) Times a Day., Disp: 30 tablet, Rfl: 2    valsartan (DIOVAN) 160 MG tablet, Take 1 tablet by mouth Daily., Disp: 90 tablet, Rfl: 1    denosumab (PROLIA) 60 MG/ML solution prefilled syringe syringe, Inject 1 mL under the skin into the appropriate area as directed 1 (One) Time for 1 dose. Repeat in 6 months1, Disp: 1 mL, Rfl: 0    Magnesium 250 MG tablet, Take 1 tablet by mouth Every Night., Disp: 90 each, Rfl: 1    oxybutynin (DITROPAN) 5 MG tablet, Take 1 tablet by mouth 2 (Two) Times a Day., Disp: 60 tablet, Rfl: 1    QUEtiapine (SEROquel) 25 MG tablet, Take 1 tablet by  "mouth Every Night., Disp: 30 tablet, Rfl: 1    Allergies:   Allergies   Allergen Reactions    Ciprofloxacin Anxiety and Rash    Atorvastatin Unknown - High Severity     Other reaction(s): Leg cramps..    Rosuvastatin Calcium Unknown - Low Severity    Sulfa Antibiotics Unknown - Low Severity       Objective     Vital Signs  /84   Pulse 64   Ht 167.6 cm (66\")   Wt 66.7 kg (147 lb)   SpO2 98%   BMI 23.73 kg/m²   Estimated body mass index is 23.73 kg/m² as calculated from the following:    Height as of this encounter: 167.6 cm (66\").    Weight as of this encounter: 66.7 kg (147 lb).    BMI is within normal parameters. No other follow-up for BMI required.      Physical Exam  Vitals and nursing note reviewed.   Constitutional:       Appearance: Normal appearance. She is normal weight.   HENT:      Head: Normocephalic.      Right Ear: External ear normal.      Left Ear: External ear normal.      Nose: Nose normal.   Eyes:      Pupils: Pupils are equal, round, and reactive to light.   Cardiovascular:      Rate and Rhythm: Normal rate and regular rhythm.      Pulses: Normal pulses.      Heart sounds: Normal heart sounds.   Pulmonary:      Effort: Pulmonary effort is normal.      Breath sounds: Normal breath sounds.   Musculoskeletal:      Cervical back: Normal range of motion and neck supple.   Skin:     General: Skin is warm and dry.   Neurological:      Mental Status: She is alert.   Psychiatric:         Mood and Affect: Mood normal.          Procedures     Assessment and Plan     1. Type 2 diabetes mellitus with stage 3b chronic kidney disease, with long-term current use of insulin  Glimepiride 2 mg twice daily he is on metformin ER she is on and pioglitazone.  Next month will do A1c and CMP and magnesium  - glimepiride (AMARYL) 2 MG tablet; Take 1 to 3 tablets po qd for diabetes    2. Renal insufficiency  Creatinine 1.07.    3. Low magnesium level  Low magnesium 1.16    4. Benign essential hypertension  Seems " steady    5. Depressive disorder  Loss of weight 13 pounds in 1 month try the oxybutynin 5 mg twice daily and the Seroquel 25 mg nightly       Follow Up  Return in about 1 month (around 8/1/2024).    [facility]  [address] [place] [id number] [phone]

## 2024-07-08 DIAGNOSIS — E11.22 TYPE 2 DIABETES MELLITUS WITH STAGE 3B CHRONIC KIDNEY DISEASE, WITH LONG-TERM CURRENT USE OF INSULIN: ICD-10-CM

## 2024-07-08 DIAGNOSIS — N18.32 TYPE 2 DIABETES MELLITUS WITH STAGE 3B CHRONIC KIDNEY DISEASE, WITH LONG-TERM CURRENT USE OF INSULIN: ICD-10-CM

## 2024-07-08 DIAGNOSIS — Z79.4 TYPE 2 DIABETES MELLITUS WITH STAGE 3B CHRONIC KIDNEY DISEASE, WITH LONG-TERM CURRENT USE OF INSULIN: ICD-10-CM

## 2024-07-08 RX ORDER — VALSARTAN 160 MG/1
160 TABLET ORAL DAILY
Qty: 30 TABLET | Refills: 0 | Status: SHIPPED | OUTPATIENT
Start: 2024-07-08

## 2024-07-08 RX ORDER — GLIMEPIRIDE 2 MG/1
TABLET ORAL
Qty: 90 TABLET | Refills: 0 | Status: SHIPPED | OUTPATIENT
Start: 2024-07-08

## 2024-07-17 DIAGNOSIS — E11.22 TYPE 2 DIABETES MELLITUS WITH STAGE 3B CHRONIC KIDNEY DISEASE, WITH LONG-TERM CURRENT USE OF INSULIN: ICD-10-CM

## 2024-07-17 DIAGNOSIS — Z79.4 TYPE 2 DIABETES MELLITUS WITH STAGE 3B CHRONIC KIDNEY DISEASE, WITH LONG-TERM CURRENT USE OF INSULIN: ICD-10-CM

## 2024-07-17 DIAGNOSIS — N18.32 TYPE 2 DIABETES MELLITUS WITH STAGE 3B CHRONIC KIDNEY DISEASE, WITH LONG-TERM CURRENT USE OF INSULIN: ICD-10-CM

## 2024-07-17 DIAGNOSIS — K62.5 RECTAL BLEEDING: ICD-10-CM

## 2024-07-17 RX ORDER — PIOGLITAZONEHYDROCHLORIDE 30 MG/1
30 TABLET ORAL DAILY
Qty: 90 TABLET | Refills: 0 | OUTPATIENT
Start: 2024-07-17

## 2024-07-17 RX ORDER — VALSARTAN 160 MG/1
160 TABLET ORAL DAILY
Qty: 90 TABLET | Refills: 0 | OUTPATIENT
Start: 2024-07-17

## 2024-07-17 RX ORDER — ALENDRONATE SODIUM 70 MG/1
70 TABLET ORAL
Qty: 12 TABLET | Refills: 0 | OUTPATIENT
Start: 2024-07-17

## 2024-07-17 RX ORDER — HYDROXYZINE PAMOATE 25 MG/1
25 CAPSULE ORAL 3 TIMES DAILY PRN
Qty: 90 CAPSULE | Refills: 0 | Status: SHIPPED | OUTPATIENT
Start: 2024-07-17

## 2024-07-17 RX ORDER — HYDROXYZINE PAMOATE 25 MG/1
CAPSULE ORAL
Qty: 60 CAPSULE | Refills: 0 | OUTPATIENT
Start: 2024-07-17

## 2024-07-17 RX ORDER — DONEPEZIL HYDROCHLORIDE 10 MG/1
10 TABLET, FILM COATED ORAL NIGHTLY
Qty: 90 TABLET | Refills: 0 | Status: SHIPPED | OUTPATIENT
Start: 2024-07-17

## 2024-07-17 NOTE — TELEPHONE ENCOUNTER
Giving 90 days of metformin 500 mg, donepezil 10 mg.  No fasamax due to being on prolia  [facility]os refill is too soon.  Valsartan 160 mg has enough til appt 8-2-24  Giving 30 days prn of hydroxyzine 25 mg

## 2024-08-02 ENCOUNTER — OFFICE VISIT (OUTPATIENT)
Dept: FAMILY MEDICINE CLINIC | Facility: CLINIC | Age: 84
End: 2024-08-02
Payer: MEDICARE

## 2024-08-02 VITALS
BODY MASS INDEX: 23.78 KG/M2 | WEIGHT: 148 LBS | OXYGEN SATURATION: 96 % | HEIGHT: 66 IN | DIASTOLIC BLOOD PRESSURE: 82 MMHG | SYSTOLIC BLOOD PRESSURE: 124 MMHG | HEART RATE: 73 BPM

## 2024-08-02 DIAGNOSIS — E11.22 TYPE 2 DIABETES MELLITUS WITH STAGE 3B CHRONIC KIDNEY DISEASE, WITH LONG-TERM CURRENT USE OF INSULIN: ICD-10-CM

## 2024-08-02 DIAGNOSIS — Z79.4 TYPE 2 DIABETES MELLITUS WITH STAGE 3B CHRONIC KIDNEY DISEASE, WITH LONG-TERM CURRENT USE OF INSULIN: ICD-10-CM

## 2024-08-02 DIAGNOSIS — H91.93 BILATERAL HEARING LOSS, UNSPECIFIED HEARING LOSS TYPE: ICD-10-CM

## 2024-08-02 DIAGNOSIS — R79.89 ELEVATED LFTS: ICD-10-CM

## 2024-08-02 DIAGNOSIS — N18.32 TYPE 2 DIABETES MELLITUS WITH STAGE 3B CHRONIC KIDNEY DISEASE, WITH LONG-TERM CURRENT USE OF INSULIN: ICD-10-CM

## 2024-08-02 DIAGNOSIS — I10 PRIMARY HYPERTENSION: ICD-10-CM

## 2024-08-02 DIAGNOSIS — R25.2 CRAMP IN LIMB: ICD-10-CM

## 2024-08-02 DIAGNOSIS — N39.0 CHRONIC UTI: Primary | ICD-10-CM

## 2024-08-02 PROCEDURE — 1159F MED LIST DOCD IN RCRD: CPT | Performed by: FAMILY MEDICINE

## 2024-08-02 PROCEDURE — 3074F SYST BP LT 130 MM HG: CPT | Performed by: FAMILY MEDICINE

## 2024-08-02 PROCEDURE — 1160F RVW MEDS BY RX/DR IN RCRD: CPT | Performed by: FAMILY MEDICINE

## 2024-08-02 PROCEDURE — 99214 OFFICE O/P EST MOD 30 MIN: CPT | Performed by: FAMILY MEDICINE

## 2024-08-02 PROCEDURE — 3079F DIAST BP 80-89 MM HG: CPT | Performed by: FAMILY MEDICINE

## 2024-08-02 PROCEDURE — 1126F AMNT PAIN NOTED NONE PRSNT: CPT | Performed by: FAMILY MEDICINE

## 2024-08-02 RX ORDER — QUETIAPINE FUMARATE 50 MG/1
50 TABLET, FILM COATED ORAL NIGHTLY
Qty: 30 TABLET | Refills: 2 | Status: SHIPPED | OUTPATIENT
Start: 2024-08-02

## 2024-08-02 NOTE — PROGRESS NOTES
Office Note     Name: Jennyfer Portillo    : 1940     MRN: [id number]     Chief Complaint  mrc    Subjective     History of Present Illness:  Jennyfer Portillo is a 84 y.o. female who presents today for she is still getting up spite Seroquel 25 mg with quantity I do orthostatic blood pressure results and double the Seroquel but she sleeps till 10 or 12 fasting sugar being 53, 56.  After breakfast 253 but generally running 1 40-1 50.  Is hard of hearing.    Review of Systems:   Review of Systems    Past Medical History:   Past Medical History:   Diagnosis Date    Benign essential hypertension     Bilateral hearing loss     Carotid artery disease     ANGIOPLASTY, ANOTHER STENT     Chronic obstructive lung disease     Colon polyp     Depressive disorder     Diverticulosis 2016    , IN SIGMOID COLON, INTERNAL HEMMORRHOIDS, REPEAT RECOMMENDED 5 YRSOD SEVERE    GERD (gastroesophageal reflux disease)     Glaucoma     High risk medication use     Hx of myocardial infarction     Megaloblastic anemia     Migraines     Mixed hyperlipidemia     DUE TO TYPE 2 DIABETES, W. HYPERGLYCEMIA    Obesity     Osteopenia     OF BOTH HIPS/ SPINE    Pregnancy     1962,1,1963,8,1960    Recurrent UTI (urinary tract infection)     Type 2 diabetes mellitus     STAGE 3 CHRONIC KIDNEY DISEASE, W/O LONG  TERM USE OF CURRENT INSULIN    Vitamin D deficiency        Past Surgical History:   Past Surgical History:   Procedure Laterality Date    COLONOSCOPY  2012    MODERATLEY SEVERE DIVERTICLOSIS FOUND IN THE SIGMOID COLON AND DESCENDING COLON. RECOMMENDED IN 3 YRS    ESSURE TUBAL LIGATION  1968    BILATERAL    HYSTERECTOMY  1979    ORIF ANKLE FRACTURE Right 2005    OPEN REDUCTION INTERNAL FIXATION, SUCESSFUL OUTCOME    RETINAL DETACHMENT SURGERY  2009    REATTACHED , LEFT    UPPER GASTROINTESTINAL ENDOSCOPY         Family History:   Family History   Problem Relation Age of Onset    Colon polyps Mother     Coronary  artery disease Mother     Colon cancer Mother     Diabetes Mother     Hyperlipidemia Mother     Hypertension Mother     Peripheral vascular disease Mother     Colon polyps Sister     Colon cancer Sister     Colon cancer Sister        Social History:   Social History     Socioeconomic History    Marital status:     Number of children: 5    Highest education level: 12th grade   Tobacco Use    Smoking status: Former     Current packs/day: 0.00     Average packs/day: 0.3 packs/day for 5.0 years (1.3 ttl pk-yrs)     Types: Cigarettes     Start date:      Quit date:      Years since quittin.6     Passive exposure: Past    Smokeless tobacco: Never   Vaping Use    Vaping status: Never Used   Substance and Sexual Activity    Alcohol use: Not Currently    Drug use: Defer    Sexual activity: Defer       Immunizations:   Immunization History   Administered Date(s) Administered    COVID-19 (ANGEL) 2021, 2021    COVID-19 (PFIZER) BIVALENT 12+YRS 10/26/2022    COVID-19 F23 (MODERNA) 12YRS+ (SPIKEVAX) 2023    Flu Vaccine Quad PF >36MO 10/29/2014    Fluzone High-Dose 65+yrs 10/31/2023    Fluzone Quad >6mos (Multi-dose) 10/20/2020    Influenza, Unspecified 10/20/2020    Pneumococcal Conjugate 13-Valent (PCV13) 2019    Pneumococcal Polysaccharide (PPSV23) 2008    Pneumococcal, Unspecified 2008, 2019    TD Preservative Free (Tenivac) 2016    Td (TDVAX) 2016        Medications:     Current Outpatient Medications:     aspirin 81 MG EC tablet, Take 1 tablet by mouth Daily., Disp: , Rfl:     brimonidine-timolol (COMBIGAN) 0.2-0.5 % ophthalmic solution, 1 drop., Disp: , Rfl:     Combigan 0.2-0.5 % ophthalmic solution, , Disp: , Rfl:     donepezil (Aricept) 10 MG tablet, Take 1 tablet by mouth Every Night., Disp: 90 tablet, Rfl: 0    escitalopram (LEXAPRO) 10 MG tablet, Take 1 tablet by mouth Daily., Disp: 90 tablet, Rfl: 3    glimepiride (AMARYL) 2 MG tablet, TAKE 1  TO 3 TABLETS BY MOUTH EVERY DAY FOR DIABETES, Disp: 90 tablet, Rfl: 0    hydrOXYzine pamoate (VISTARIL) 25 MG capsule, Take 1 capsule by mouth 3 (Three) Times a Day As Needed for Anxiety., Disp: 90 capsule, Rfl: 0    latanoprost (XALATAN) 0.005 % ophthalmic solution, , Disp: , Rfl:     metFORMIN (GLUCOPHAGE) 500 MG tablet, TAKE 1 TABLET BY MOUTH EVERY MORNING WITH BREAKFAST, Disp: 90 tablet, Rfl: 0    methenamine (HIPREX) 1 g tablet, Take 1 tablet by mouth 2 (Two) Times a Day With Meals., Disp: 180 tablet, Rfl: 1    metoprolol succinate XL (TOPROL-XL) 50 MG 24 hr tablet, Take 1 tablet by mouth Daily., Disp: 90 tablet, Rfl: 3    nitroglycerin (Nitrostat) 0.4 MG SL tablet, Place 1 tablet under the tongue Every 5 (Five) Minutes As Needed for Chest Pain. Take no more than 3 doses in 15 minutes., Disp: 25 tablet, Rfl: 1    oxybutynin (DITROPAN) 5 MG tablet, Take 1 tablet by mouth 2 (Two) Times a Day., Disp: 60 tablet, Rfl: 1    pantoprazole (PROTONIX) 40 MG EC tablet, Take 1 tablet by mouth Daily., Disp: 90 tablet, Rfl: 3    pioglitazone (ACTOS) 30 MG tablet, Take 1 tablet by mouth Daily., Disp: 90 tablet, Rfl: 1    pravastatin (PRAVACHOL) 80 MG tablet, TAKE 1 TABLET BY MOUTH EVERY DAY, Disp: 90 tablet, Rfl: 2    QUEtiapine (SEROquel) 25 MG tablet, Take 1 tablet by mouth Every Night., Disp: 30 tablet, Rfl: 1    tiZANidine (ZANAFLEX) 4 MG tablet, Take 0.5 tablets by mouth 2 (Two) Times a Day., Disp: 30 tablet, Rfl: 2    valsartan (DIOVAN) 160 MG tablet, TAKE 1 TABLET BY MOUTH EVERY DAY, Disp: 30 tablet, Rfl: 0    denosumab (PROLIA) 60 MG/ML solution prefilled syringe syringe, Inject 1 mL under the skin into the appropriate area as directed 1 (One) Time for 1 dose. Repeat in 6 months1, Disp: 1 mL, Rfl: 0    Allergies:   Allergies   Allergen Reactions    Ciprofloxacin Anxiety and Rash    Atorvastatin Unknown - High Severity     Other reaction(s): Leg cramps..    Rosuvastatin Calcium Unknown - Low Severity    Sulfa  "Antibiotics Unknown - Low Severity       Objective     Vital Signs  /82   Pulse 73   Ht 167.6 cm (66\")   Wt 67.1 kg (148 lb)   SpO2 96%   BMI 23.89 kg/m²   Estimated body mass index is 23.89 kg/m² as calculated from the following:    Height as of this encounter: 167.6 cm (66\").    Weight as of this encounter: 67.1 kg (148 lb).    BMI is within normal parameters. No other follow-up for BMI required.      Physical Exam  Vitals and nursing note reviewed.   Constitutional:       Appearance: Normal appearance. She is obese.   HENT:      Head: Normocephalic.      Right Ear: External ear normal. There is impacted cerumen.      Left Ear: External ear normal. There is impacted cerumen.      Nose: Nose normal.   Eyes:      Pupils: Pupils are equal, round, and reactive to light.   Neck:      Vascular: No carotid bruit.   Cardiovascular:      Rate and Rhythm: Normal rate and regular rhythm.      Pulses: Normal pulses.      Heart sounds: Normal heart sounds.   Pulmonary:      Effort: Pulmonary effort is normal.      Breath sounds: Normal breath sounds. No wheezing, rhonchi or rales.   Musculoskeletal:      Cervical back: Normal range of motion and neck supple.   Skin:     General: Skin is warm and dry.   Neurological:      Mental Status: She is alert. She is disoriented.   Psychiatric:         Mood and Affect: Mood normal.      130/80 LYING KIERRA ----130/81 STANDING UP---so increased the seroquel    Procedures     Assessment and Plan     1. Chronic UTI  Urgent treatment center afforded her Macrodantin offered nail  --increase    2. Type 2 diabetes mellitus with stage 3b chronic kidney disease, with long-term current use of insulin  Going good  - Hemoglobin A1c    3. Primary hypertension  Controlled  - CBC & Differential    4. Elevated LFTs  Recheck  - Comprehensive Metabolic Panel    5. Bilateral hearing loss, unspecified hearing loss type  Cerumen impaction    6. Cramp in limb  Magnesium she has been off for a week  - " Magnesium     7.  IMPACTED CERUMEN BILAT  RINSED OUT    Follow Up  Return in about 3 months (around 11/2/2024).    Cale VINSONE [facility]  [address] [place] [id number] [phone]

## 2024-08-03 LAB
ALBUMIN SERPL-MCNC: 3.9 G/DL (ref 3.7–4.7)
ALP SERPL-CCNC: 52 IU/L (ref 44–121)
ALT SERPL-CCNC: 12 IU/L (ref 0–32)
AST SERPL-CCNC: 14 IU/L (ref 0–40)
BASOPHILS # BLD AUTO: 0 X10E3/UL (ref 0–0.2)
BASOPHILS NFR BLD AUTO: 1 %
BILIRUB SERPL-MCNC: 0.3 MG/DL (ref 0–1.2)
BUN SERPL-MCNC: 33 MG/DL (ref 8–27)
BUN/CREAT SERPL: 17 (ref 12–28)
CALCIUM SERPL-MCNC: 9 MG/DL (ref 8.7–10.3)
CHLORIDE SERPL-SCNC: 97 MMOL/L (ref 96–106)
CO2 SERPL-SCNC: 18 MMOL/L (ref 20–29)
CREAT SERPL-MCNC: 1.93 MG/DL (ref 0.57–1)
EGFRCR SERPLBLD CKD-EPI 2021: 25 ML/MIN/1.73
EOSINOPHIL # BLD AUTO: 0.1 X10E3/UL (ref 0–0.4)
EOSINOPHIL NFR BLD AUTO: 2 %
ERYTHROCYTE [DISTWIDTH] IN BLOOD BY AUTOMATED COUNT: 12.1 % (ref 11.7–15.4)
GLOBULIN SER CALC-MCNC: 2 G/DL (ref 1.5–4.5)
GLUCOSE SERPL-MCNC: 213 MG/DL (ref 70–99)
HBA1C MFR BLD: 6.9 % (ref 4.8–5.6)
HCT VFR BLD AUTO: 32.6 % (ref 34–46.6)
HGB BLD-MCNC: 10.5 G/DL (ref 11.1–15.9)
IMM GRANULOCYTES # BLD AUTO: 0 X10E3/UL (ref 0–0.1)
IMM GRANULOCYTES NFR BLD AUTO: 1 %
LYMPHOCYTES # BLD AUTO: 1 X10E3/UL (ref 0.7–3.1)
LYMPHOCYTES NFR BLD AUTO: 22 %
MAGNESIUM SERPL-MCNC: 2 MG/DL (ref 1.6–2.3)
MCH RBC QN AUTO: 31.2 PG (ref 26.6–33)
MCHC RBC AUTO-ENTMCNC: 32.2 G/DL (ref 31.5–35.7)
MCV RBC AUTO: 97 FL (ref 79–97)
MONOCYTES # BLD AUTO: 0.6 X10E3/UL (ref 0.1–0.9)
MONOCYTES NFR BLD AUTO: 13 %
NEUTROPHILS # BLD AUTO: 2.6 X10E3/UL (ref 1.4–7)
NEUTROPHILS NFR BLD AUTO: 61 %
PLATELET # BLD AUTO: 329 X10E3/UL (ref 150–450)
POTASSIUM SERPL-SCNC: 5.1 MMOL/L (ref 3.5–5.2)
PROT SERPL-MCNC: 5.9 G/DL (ref 6–8.5)
RBC # BLD AUTO: 3.37 X10E6/UL (ref 3.77–5.28)
SODIUM SERPL-SCNC: 128 MMOL/L (ref 134–144)
WBC # BLD AUTO: 4.3 X10E3/UL (ref 3.4–10.8)

## 2024-08-13 RX ORDER — VALSARTAN 160 MG/1
160 TABLET ORAL DAILY
Qty: 90 TABLET | Refills: 0 | Status: SHIPPED | OUTPATIENT
Start: 2024-08-13

## 2024-08-13 NOTE — TELEPHONE ENCOUNTER
Caller: DHRUV WILSON    Relationship: Emergency Contact    Best call back number: [phone]    Requested Prescriptions:   Requested Prescriptions     Pending Prescriptions Disp Refills    hydrALAZINE (APRESOLINE) 10 MG tablet [Pharmacy Med Name: HYDRALAZINE 10MG] 90 tablet 0     Sig: TAKE 1 TABLET BY MOUTH EVERY 8 HOURS AS NEEDED FOR SBP GREATER THAN 150     Signed Prescriptions Disp Refills    valsartan (DIOVAN) 160 MG tablet 90 tablet 0     Sig: TAKE 1 TABLET BY MOUTH EVERY DAY     Authorizing Provider: ALLY JUAN     Ordering User: KRISHNA TORRES        Pharmacy where request should be sent: [facility] [address] [facility] [phone] [facility] [phone]      Last office visit with prescribing clinician: 8/2/2024   Last telemedicine visit with prescribing clinician: Visit date not found   Next office visit with prescribing clinician: Visit date not found         Does the patient have less than a 3 day supply:  [x] Yes  [] No    Would you like a call back once the refill request has been completed: [] Yes [] No    If the office needs to give you a call back, can they leave a voicemail: [] Yes [x] No    Alisha Tellez Rep   08/13/24 13:14 EDT

## 2024-08-14 RX ORDER — HYDRALAZINE HYDROCHLORIDE 10 MG/1
TABLET, FILM COATED ORAL
Qty: 90 TABLET | Refills: 0 | Status: SHIPPED | OUTPATIENT
Start: 2024-08-14

## 2024-08-20 RX ORDER — QUETIAPINE FUMARATE 25 MG/1
25 TABLET, FILM COATED ORAL
Qty: 30 TABLET | Refills: 0 | OUTPATIENT
Start: 2024-08-20

## 2024-08-26 RX ORDER — OXYBUTYNIN CHLORIDE 5 MG/1
5 TABLET ORAL 2 TIMES DAILY
Qty: 60 TABLET | Refills: 2 | Status: SHIPPED | OUTPATIENT
Start: 2024-08-26

## 2024-09-16 RX ORDER — HYDRALAZINE HYDROCHLORIDE 10 MG/1
TABLET, FILM COATED ORAL
Qty: 90 TABLET | Refills: 0 | Status: SHIPPED | OUTPATIENT
Start: 2024-09-16

## 2024-10-07 ENCOUNTER — OFFICE VISIT (OUTPATIENT)
Dept: FAMILY MEDICINE CLINIC | Facility: CLINIC | Age: 84
End: 2024-10-07
Payer: MEDICARE

## 2024-10-07 VITALS
HEIGHT: 66 IN | DIASTOLIC BLOOD PRESSURE: 60 MMHG | SYSTOLIC BLOOD PRESSURE: 120 MMHG | WEIGHT: 139 LBS | HEART RATE: 80 BPM | OXYGEN SATURATION: 98 % | BODY MASS INDEX: 22.34 KG/M2

## 2024-10-07 DIAGNOSIS — K21.9 GASTROESOPHAGEAL REFLUX DISEASE WITHOUT ESOPHAGITIS: ICD-10-CM

## 2024-10-07 PROCEDURE — 1160F RVW MEDS BY RX/DR IN RCRD: CPT | Performed by: FAMILY MEDICINE

## 2024-10-07 PROCEDURE — 1159F MED LIST DOCD IN RCRD: CPT | Performed by: FAMILY MEDICINE

## 2024-10-07 PROCEDURE — 3074F SYST BP LT 130 MM HG: CPT | Performed by: FAMILY MEDICINE

## 2024-10-07 PROCEDURE — 1126F AMNT PAIN NOTED NONE PRSNT: CPT | Performed by: FAMILY MEDICINE

## 2024-10-07 PROCEDURE — 99214 OFFICE O/P EST MOD 30 MIN: CPT | Performed by: FAMILY MEDICINE

## 2024-10-07 PROCEDURE — 3078F DIAST BP <80 MM HG: CPT | Performed by: FAMILY MEDICINE

## 2024-10-07 RX ORDER — PANTOPRAZOLE SODIUM 40 MG/1
40 TABLET, DELAYED RELEASE ORAL 2 TIMES DAILY
Qty: 180 TABLET | Refills: 3 | Status: SHIPPED | OUTPATIENT
Start: 2024-10-07

## 2024-10-07 RX ORDER — QUETIAPINE FUMARATE 50 MG/1
25 TABLET, FILM COATED ORAL NIGHTLY
Qty: 30 TABLET | Refills: 2 | Status: SHIPPED | OUTPATIENT
Start: 2024-10-07

## 2024-10-07 NOTE — PROGRESS NOTES
Follow Up Office Visit      Date of Visit:  10/07/2024   Patient Name: Jennyfer Portillo  : 1940   MRN: [id number]     Chief Complaint:    Chief Complaint   Patient presents with    Hospital Follow Up Visit     Broken hip and fall       History of Present Illness: Jennyfer Portillo is a 84 y.o. female who is here today for follow up.    History of Present Illness  The patient presents for evaluation of multiple medical concerns. She is accompanied by an adult male.    She experienced a fall in her bedroom, resulting in a broken hip. The cause of the fall is unknown as she was blocking the door. She has had several falls in the past, including one after returning home from rehab, which necessitated another emergency room visit. Pain medication was administered but did not provide relief. Oxycodone was also given but caused drowsiness. It took three days for her to sleep. She is currently taking Tylenol for pain management. Most of her staples have been removed, with two remaining to be removed tomorrow. She uses a walker for mobility.    She has lost about 25 pounds and has a decreased appetite. She experiences frequent belching when drinking fluids. She reports stomach pain and has difficulty maintaining adequate fluid intake. She occasionally has difficulty swallowing and sometimes vomits phlegm. She has not had recent blood work done. She underwent cholecystectomy due to gallstones and had an endoscopy a few years ago. She reports no presence of blood or dark stool.    She experiences hallucinations, often talking about  individuals. She spends most of her time in bed, talking and reaching out. She denies any wandering at night or checking drawers.    Her current medications include Actos 30 mg, Lexapro 10 mg, and Lovenox injections for blood clot prevention. She is more mobile now and takes Vistaril twice a day. Seroquel helps her sleep but causes a drop in her blood pressure. She takes glimepiride 2  mg twice in the morning and once at night. She was taken off metformin due to dehydration. She has not had pneumonia.      Subjective      Review of Systems:   Review of Systems    Past Medical History:   Past Medical History:   Diagnosis Date    Benign essential hypertension     Bilateral hearing loss     Carotid artery disease 1998    ANGIOPLASTY, ANOTHER STENT 2001    Chronic obstructive lung disease     Colon polyp     Depressive disorder     Diverticulosis 01/14/2016    , IN SIGMOID COLON, INTERNAL HEMMORRHOIDS, REPEAT RECOMMENDED 5 YRSOD SEVERE    GERD (gastroesophageal reflux disease)     Glaucoma     High risk medication use     Hx of myocardial infarction     Megaloblastic anemia     Migraines     Mixed hyperlipidemia     DUE TO TYPE 2 DIABETES, W. HYPERGLYCEMIA    Obesity     Osteopenia     OF BOTH HIPS/ SPINE    Pregnancy     1962,1961,1963,1958,1960    Recurrent UTI (urinary tract infection)     Type 2 diabetes mellitus     STAGE 3 CHRONIC KIDNEY DISEASE, W/O LONG  TERM USE OF CURRENT INSULIN    Vitamin D deficiency        Past Surgical History:   Past Surgical History:   Procedure Laterality Date    COLONOSCOPY  11/20/2012    MODERATLEY SEVERE DIVERTICLOSIS FOUND IN THE SIGMOID COLON AND DESCENDING COLON. RECOMMENDED IN 3 YRS    ESSURE TUBAL LIGATION  1968    BILATERAL    HIP BIPOLAR REPLACEMENT Bilateral 09/2024    HYSTERECTOMY  1979    ORIF ANKLE FRACTURE Right 2005    OPEN REDUCTION INTERNAL FIXATION, SUCESSFUL OUTCOME    RETINAL DETACHMENT SURGERY  06/04/2009    REATTACHED , LEFT    UPPER GASTROINTESTINAL ENDOSCOPY         Family History:   Family History   Problem Relation Age of Onset    Colon polyps Mother     Coronary artery disease Mother     Colon cancer Mother     Diabetes Mother     Hyperlipidemia Mother     Hypertension Mother     Peripheral vascular disease Mother     Colon polyps Sister     Colon cancer Sister     Colon cancer Sister        Social History:   Social History      Socioeconomic History    Marital status:     Number of children: 5    Highest education level: 12th grade   Tobacco Use    Smoking status: Former     Current packs/day: 0.00     Average packs/day: 0.3 packs/day for 5.0 years (1.3 ttl pk-yrs)     Types: Cigarettes     Start date:      Quit date:      Years since quittin.7     Passive exposure: Past    Smokeless tobacco: Never   Vaping Use    Vaping status: Never Used   Substance and Sexual Activity    Alcohol use: Not Currently    Drug use: Defer    Sexual activity: Defer       Medications:     Current Outpatient Medications:     brimonidine-timolol (COMBIGAN) 0.2-0.5 % ophthalmic solution, 1 drop., Disp: , Rfl:     Combigan 0.2-0.5 % ophthalmic solution, , Disp: , Rfl:     donepezil (Aricept) 10 MG tablet, Take 1 tablet by mouth Every Night., Disp: 90 tablet, Rfl: 0    escitalopram (LEXAPRO) 10 MG tablet, Take 1 tablet by mouth Daily., Disp: 90 tablet, Rfl: 3    glimepiride (AMARYL) 2 MG tablet, TAKE 1 TO 3 TABLETS BY MOUTH EVERY DAY FOR DIABETES, Disp: 90 tablet, Rfl: 0    hydrALAZINE (APRESOLINE) 10 MG tablet, TAKE 1 TABLET BY MOUTH EVERY 8 HOURS AS NEEDED FOR SBP GREATER THAN 150, Disp: 90 tablet, Rfl: 0    hydrOXYzine pamoate (VISTARIL) 25 MG capsule, Take 1 capsule by mouth 3 (Three) Times a Day As Needed for Anxiety., Disp: 90 capsule, Rfl: 0    methenamine (HIPREX) 1 g tablet, Take 1 tablet by mouth 2 (Two) Times a Day With Meals., Disp: 180 tablet, Rfl: 1    metoprolol succinate XL (TOPROL-XL) 50 MG 24 hr tablet, Take 1 tablet by mouth Daily., Disp: 90 tablet, Rfl: 3    nitroglycerin (Nitrostat) 0.4 MG SL tablet, Place 1 tablet under the tongue Every 5 (Five) Minutes As Needed for Chest Pain. Take no more than 3 doses in 15 minutes., Disp: 25 tablet, Rfl: 1    oxybutynin (DITROPAN) 5 MG tablet, TAKE 1 TABLET BY MOUTH 2 TIMES A DAY, Disp: 60 tablet, Rfl: 2    pantoprazole (PROTONIX) 40 MG EC tablet, Take 1 tablet by mouth 2 (Two) Times  "a Day., Disp: 180 tablet, Rfl: 3    pioglitazone (ACTOS) 30 MG tablet, Take 1 tablet by mouth Daily., Disp: 90 tablet, Rfl: 1    pravastatin (PRAVACHOL) 80 MG tablet, TAKE 1 TABLET BY MOUTH EVERY DAY, Disp: 90 tablet, Rfl: 2    QUEtiapine (SEROquel) 50 MG tablet, Take 0.5 tablets by mouth Every Night., Disp: 30 tablet, Rfl: 2    tiZANidine (ZANAFLEX) 4 MG tablet, TAKE 1/2 TABLET BY MOUTH 2 TIMES A DAY, Disp: 30 tablet, Rfl: 1    valsartan (DIOVAN) 160 MG tablet, TAKE 1 TABLET BY MOUTH EVERY DAY, Disp: 90 tablet, Rfl: 0    denosumab (PROLIA) 60 MG/ML solution prefilled syringe syringe, Inject 1 mL under the skin into the appropriate area as directed 1 (One) Time for 1 dose. Repeat in 6 months1, Disp: 1 mL, Rfl: 0    latanoprost (XALATAN) 0.005 % ophthalmic solution, , Disp: , Rfl:     Allergies:   Allergies   Allergen Reactions    Ciprofloxacin Anxiety and Rash    Atorvastatin Unknown - High Severity     Other reaction(s): Leg cramps..    Rosuvastatin Calcium Unknown - Low Severity    Sulfa Antibiotics Unknown - Low Severity       Objective     Physical Exam:  Vital Signs:   Vitals:    10/07/24 1311   BP: 120/60   Pulse: 80   SpO2: 98%   Weight: 63 kg (139 lb)   Height: 167.6 cm (66\")     Body mass index is 22.44 kg/m².     Physical Exam  Vitals and nursing note reviewed.   Constitutional:       Appearance: Normal appearance. She is normal weight.   HENT:      Head: Normocephalic.      Right Ear: External ear normal.      Left Ear: External ear normal.      Nose: Nose normal.   Eyes:      Pupils: Pupils are equal, round, and reactive to light.   Neck:      Vascular: No carotid bruit.   Cardiovascular:      Rate and Rhythm: Normal rate and regular rhythm.      Pulses: Normal pulses.      Heart sounds: Normal heart sounds.   Pulmonary:      Effort: Pulmonary effort is normal.      Breath sounds: Normal breath sounds.   Abdominal:      Tenderness: There is abdominal tenderness.   Musculoskeletal:      Cervical back: " Normal range of motion and neck supple.   Skin:     General: Skin is warm and dry.   Neurological:      Mental Status: She is alert.   Psychiatric:         Mood and Affect: Mood normal.       Physical Exam  Vital Signs  Weight is 133.    Results      Procedures      Assessment / Plan      Assessment/Plan:   Diagnoses and all orders for this visit:    1. Gastroesophageal reflux disease without esophagitis  -     pantoprazole (PROTONIX) 40 MG EC tablet; Take 1 tablet by mouth 2 (Two) Times a Day.  Dispense: 180 tablet; Refill: 3    Other orders  -     QUEtiapine (SEROquel) 50 MG tablet; Take 0.5 tablets by mouth Every Night.  Dispense: 30 tablet; Refill: 2       Assessment & Plan  1. Weight loss.  She has experienced a weight loss of 21 pounds since 2023. Her history includes resolved biliary tract cancer and reflux esophagitis diagnosed via endoscopy a few years ago. Pantoprazole was prescribed to be taken twice daily. She is advised to gain weight over the next 6 weeks.     2. Hip fracture.  She sustained a hip fracture due to a fall and has had multiple falls since then. Staples from the surgery are almost completely removed. She is advised to use a walker at all times to prevent further falls.    3. Pain management.  She has been taking oxycodone, which does not effectively manage her pain and causes her to stay awake. She is currently taking Tylenol for pain relief.     4. Insomnia.  She has been prescribed Seroquel 25 mg to help with sleep, but higher doses caused excessive drowsiness. The dosage has been adjusted back to 25 mg.    5. Hallucinations.  She experiences hallucinations and talks about  family members. Seroquel has been prescribed to manage these symptoms.    6. Dehydration.  She was taken off metformin due to dehydration. Her current medication regimen includes glimepiride 2 mg in the morning and 1 mg at night. Her blood sugar levels are stable.    7. Lack of appetite.  She has a lack  of appetite and difficulty drinking enough fluids. Aricept 10 mg taken nightly may be contributing to this issue.    8. Blood clot prevention.  She has been taking Lovenox shots for blood clot prevention post-hip surgery. Given her increased mobility, she is advised to switch to aspirin.    Follow-up  Return in 6 weeks for follow up.    Follow Up:   No follow-ups on file.back in 6 weeks    Patient or patient representative verbalized consent for the use of Ambient Listening during the visit with  Cale Pearson MD for chart documentation. 10/7/2024  13:46 EDT     @[facility] [place]

## 2024-10-24 DIAGNOSIS — K62.5 RECTAL BLEEDING: ICD-10-CM

## 2024-10-24 RX ORDER — METHENAMINE HIPPURATE 1000 MG/1
1 TABLET ORAL 2 TIMES DAILY WITH MEALS
Qty: 180 TABLET | Refills: 1 | Status: CANCELLED | OUTPATIENT
Start: 2024-10-24

## 2024-10-24 RX ORDER — PIOGLITAZONEHYDROCHLORIDE 30 MG/1
30 TABLET ORAL DAILY
Qty: 90 TABLET | Refills: 1 | Status: SHIPPED | OUTPATIENT
Start: 2024-10-24

## 2024-10-24 NOTE — TELEPHONE ENCOUNTER
Caller: DHRUV WILSON    Relationship: Emergency Contact    Best call back number: [phone]    Requested Prescriptions:   Requested Prescriptions     Pending Prescriptions Disp Refills    pioglitazone (ACTOS) 30 MG tablet 90 tablet 1     Sig: Take 1 tablet by mouth Daily.    methenamine (HIPREX) 1 g tablet 180 tablet 1     Sig: Take 1 tablet by mouth 2 (Two) Times a Day With Meals.        Pharmacy where request should be sent: [facility] [address] [phone] [facility] [phone] [facility]     Last office visit with prescribing clinician: 10/7/2024   Last telemedicine visit with prescribing clinician: Visit date not found   Next office visit with prescribing clinician: 11/25/2024     Additional details provided by patient: PATIENT IS OUT OF MEDICATION    Does the patient have less than a 3 day supply:  [x] Yes  [] No    Would you like a call back once the refill request has been completed: [x] Yes [] No    If the office needs to give you a call back, can they leave a voicemail: [x] Yes [] No    Alisha Bermeo Rep   10/24/24 15:08 EDT

## 2024-10-28 ENCOUNTER — TELEPHONE (OUTPATIENT)
Dept: FAMILY MEDICINE CLINIC | Facility: CLINIC | Age: 84
End: 2024-10-28
Payer: MEDICARE

## 2024-10-28 RX ORDER — METHENAMINE HIPPURATE 1000 MG/1
1 TABLET ORAL 2 TIMES DAILY WITH MEALS
Qty: 180 TABLET | Refills: 0 | Status: SHIPPED | OUTPATIENT
Start: 2024-10-28

## 2024-11-20 RX ORDER — HYDROXYZINE PAMOATE 25 MG/1
25 CAPSULE ORAL 3 TIMES DAILY PRN
Qty: 90 CAPSULE | Refills: 2 | Status: SHIPPED | OUTPATIENT
Start: 2024-11-20

## 2024-11-20 RX ORDER — VALSARTAN 160 MG/1
160 TABLET ORAL DAILY
Qty: 90 TABLET | Refills: 3 | Status: SHIPPED | OUTPATIENT
Start: 2024-11-20

## 2024-11-22 RX ORDER — OXYBUTYNIN CHLORIDE 5 MG/1
5 TABLET ORAL 2 TIMES DAILY
Qty: 60 TABLET | Refills: 2 | Status: SHIPPED | OUTPATIENT
Start: 2024-11-22 | End: 2024-11-25 | Stop reason: SDUPTHER

## 2024-11-25 ENCOUNTER — OFFICE VISIT (OUTPATIENT)
Dept: FAMILY MEDICINE CLINIC | Facility: CLINIC | Age: 84
End: 2024-11-25
Payer: MEDICARE

## 2024-11-25 VITALS
WEIGHT: 135 LBS | DIASTOLIC BLOOD PRESSURE: 80 MMHG | HEIGHT: 66 IN | OXYGEN SATURATION: 96 % | BODY MASS INDEX: 21.69 KG/M2 | HEART RATE: 72 BPM | SYSTOLIC BLOOD PRESSURE: 122 MMHG

## 2024-11-25 DIAGNOSIS — R41.81 SENILITY: ICD-10-CM

## 2024-11-25 DIAGNOSIS — R63.4 WEIGHT LOSS: ICD-10-CM

## 2024-11-25 DIAGNOSIS — N18.32 TYPE 2 DIABETES MELLITUS WITH STAGE 3B CHRONIC KIDNEY DISEASE, WITH LONG-TERM CURRENT USE OF INSULIN: Primary | ICD-10-CM

## 2024-11-25 DIAGNOSIS — N39.0 CHRONIC UTI: ICD-10-CM

## 2024-11-25 DIAGNOSIS — Z79.4 TYPE 2 DIABETES MELLITUS WITH STAGE 3B CHRONIC KIDNEY DISEASE, WITH LONG-TERM CURRENT USE OF INSULIN: Primary | ICD-10-CM

## 2024-11-25 DIAGNOSIS — E11.22 TYPE 2 DIABETES MELLITUS WITH STAGE 3B CHRONIC KIDNEY DISEASE, WITH LONG-TERM CURRENT USE OF INSULIN: Primary | ICD-10-CM

## 2024-11-25 DIAGNOSIS — I10 PRIMARY HYPERTENSION: ICD-10-CM

## 2024-11-25 LAB
EXPIRATION DATE: ABNORMAL
HBA1C MFR BLD: 6.5 % (ref 4.5–5.7)
Lab: ABNORMAL

## 2024-11-25 PROCEDURE — 3044F HG A1C LEVEL LT 7.0%: CPT | Performed by: FAMILY MEDICINE

## 2024-11-25 PROCEDURE — 1159F MED LIST DOCD IN RCRD: CPT | Performed by: FAMILY MEDICINE

## 2024-11-25 PROCEDURE — 83036 HEMOGLOBIN GLYCOSYLATED A1C: CPT | Performed by: FAMILY MEDICINE

## 2024-11-25 PROCEDURE — 3079F DIAST BP 80-89 MM HG: CPT | Performed by: FAMILY MEDICINE

## 2024-11-25 PROCEDURE — G0008 ADMIN INFLUENZA VIRUS VAC: HCPCS | Performed by: FAMILY MEDICINE

## 2024-11-25 PROCEDURE — 99214 OFFICE O/P EST MOD 30 MIN: CPT | Performed by: FAMILY MEDICINE

## 2024-11-25 PROCEDURE — 90662 IIV NO PRSV INCREASED AG IM: CPT | Performed by: FAMILY MEDICINE

## 2024-11-25 PROCEDURE — 1126F AMNT PAIN NOTED NONE PRSNT: CPT | Performed by: FAMILY MEDICINE

## 2024-11-25 PROCEDURE — 3074F SYST BP LT 130 MM HG: CPT | Performed by: FAMILY MEDICINE

## 2024-11-25 PROCEDURE — 1160F RVW MEDS BY RX/DR IN RCRD: CPT | Performed by: FAMILY MEDICINE

## 2024-11-25 RX ORDER — METOPROLOL SUCCINATE 50 MG/1
50 TABLET, EXTENDED RELEASE ORAL DAILY
Qty: 90 TABLET | Refills: 3 | Status: SHIPPED | OUTPATIENT
Start: 2024-11-25

## 2024-11-25 RX ORDER — ESCITALOPRAM OXALATE 10 MG/1
10 TABLET ORAL DAILY
Qty: 90 TABLET | Refills: 3 | Status: SHIPPED | OUTPATIENT
Start: 2024-11-25

## 2024-11-25 RX ORDER — AMLODIPINE BESYLATE 10 MG/1
10 TABLET ORAL DAILY
Qty: 90 TABLET | Refills: 3 | Status: SHIPPED | OUTPATIENT
Start: 2024-11-25

## 2024-11-25 RX ORDER — OXYBUTYNIN CHLORIDE 5 MG/1
5 TABLET ORAL 2 TIMES DAILY
Qty: 180 TABLET | Refills: 2 | Status: SHIPPED | OUTPATIENT
Start: 2024-11-25

## 2024-11-25 RX ORDER — METHENAMINE HIPPURATE 1000 MG/1
1 TABLET ORAL 2 TIMES DAILY WITH MEALS
Qty: 180 TABLET | Refills: 3 | Status: SHIPPED | OUTPATIENT
Start: 2024-11-25

## 2024-11-25 RX ORDER — DONEPEZIL HYDROCHLORIDE 10 MG/1
10 TABLET, FILM COATED ORAL NIGHTLY
Qty: 90 TABLET | Refills: 1 | Status: SHIPPED | OUTPATIENT
Start: 2024-11-25

## 2024-11-25 NOTE — PROGRESS NOTES
Follow Up Office Visit      Date of Visit:  2024   Patient Name: Jennyfer Portillo  : 1940   MRN: [id number]     Chief Complaint:    Chief Complaint   Patient presents with    Med Refill       History of Present Illness: Jennyfer Portillo is a 84 y.o. female who is here today for follow up.    History of Present Illness  The patient presents for a routine checkup, accompanied by an adult female.    She has experienced a weight loss of 4 pounds, even while maintaining a healthy diet. Her appetite has decreased, and she is considering supplementing her diet with protein drinks. Her physical activity is limited.    She has developed a small cyst on her back, which has since ruptured. Another cyst appears to be forming above the previous one. She reports no pain associated with these cysts.    She continues to use incontinence products and experiences awakenings 2 to 3 times per night.    Her hip condition is improving. She recently had an orthopedic consultation, which yielded positive results.    She requires refills for her medications, including glimepiride, donepezil, hydralazine, Lexapro, oxybutynin, Hiprex, Toprol, amlodipine, and tizanidine.      Subjective      Review of Systems:   Review of Systems    Past Medical History:   Past Medical History:   Diagnosis Date    Benign essential hypertension     Bilateral hearing loss     Carotid artery disease     ANGIOPLASTY, ANOTHER STENT     Chronic obstructive lung disease     Colon polyp     Depressive disorder     Diverticulosis 2016    , IN SIGMOID COLON, INTERNAL HEMMORRHOIDS, REPEAT RECOMMENDED 5 YRSOD SEVERE    GERD (gastroesophageal reflux disease)     Glaucoma     High risk medication use     Hx of myocardial infarction     Megaloblastic anemia     Migraines     Mixed hyperlipidemia     DUE TO TYPE 2 DIABETES, W. HYPERGLYCEMIA    Obesity     Osteopenia     OF BOTH HIPS/ SPINE    Pregnancy     2,,1963,8,1960    Recurrent UTI  (urinary tract infection)     Type 2 diabetes mellitus     STAGE 3 CHRONIC KIDNEY DISEASE, W/O LONG  TERM USE OF CURRENT INSULIN    Vitamin D deficiency        Past Surgical History:   Past Surgical History:   Procedure Laterality Date    COLONOSCOPY  2012    MODERATLEY SEVERE DIVERTICLOSIS FOUND IN THE SIGMOID COLON AND DESCENDING COLON. RECOMMENDED IN 3 YRS    ESSURE TUBAL LIGATION  1968    BILATERAL    HIP BIPOLAR REPLACEMENT Bilateral 2024    HYSTERECTOMY  1979    ORIF ANKLE FRACTURE Right 2005    OPEN REDUCTION INTERNAL FIXATION, SUCESSFUL OUTCOME    RETINAL DETACHMENT SURGERY  2009    REATTACHED , LEFT    UPPER GASTROINTESTINAL ENDOSCOPY         Family History:   Family History   Problem Relation Age of Onset    Colon polyps Mother     Coronary artery disease Mother     Colon cancer Mother     Diabetes Mother     Hyperlipidemia Mother     Hypertension Mother     Peripheral vascular disease Mother     Colon polyps Sister     Colon cancer Sister     Colon cancer Sister        Social History:   Social History     Socioeconomic History    Marital status:     Number of children: 5    Highest education level: 12th grade   Tobacco Use    Smoking status: Former     Current packs/day: 0.00     Average packs/day: 0.3 packs/day for 5.0 years (1.3 ttl pk-yrs)     Types: Cigarettes     Start date:      Quit date:      Years since quittin.9     Passive exposure: Past    Smokeless tobacco: Never   Vaping Use    Vaping status: Never Used   Substance and Sexual Activity    Alcohol use: Not Currently    Drug use: Defer    Sexual activity: Defer       Medications:     Current Outpatient Medications:     brimonidine-timolol (COMBIGAN) 0.2-0.5 % ophthalmic solution, 1 drop., Disp: , Rfl:     Combigan 0.2-0.5 % ophthalmic solution, , Disp: , Rfl:     donepezil (Aricept) 10 MG tablet, Take 1 tablet by mouth Every Night., Disp: 90 tablet, Rfl: 1    escitalopram (LEXAPRO) 10 MG tablet, Take 1 tablet  by mouth Daily., Disp: 90 tablet, Rfl: 3    glimepiride (AMARYL) 2 MG tablet, TAKE 1 TO 3 TABLETS BY MOUTH EVERY DAY FOR DIABETES (Patient taking differently: Take 1 tablet by mouth 3 times a day. TAKE 1 TO 3 TABLETS BY MOUTH EVERY DAY FOR DIABETES), Disp: 90 tablet, Rfl: 0    hydrALAZINE (APRESOLINE) 10 MG tablet, TAKE 1 TABLET BY MOUTH EVERY 8 HOURS AS NEEDED FOR SBP GREATER THAN 150, Disp: 90 tablet, Rfl: 0    hydrOXYzine pamoate (VISTARIL) 25 MG capsule, Take 1 capsule by mouth 3 (Three) Times a Day As Needed for Anxiety., Disp: 90 capsule, Rfl: 2    methenamine (HIPREX) 1 g tablet, Take 1 tablet by mouth 2 (Two) Times a Day With Meals., Disp: 180 tablet, Rfl: 3    metoprolol succinate XL (TOPROL-XL) 50 MG 24 hr tablet, Take 1 tablet by mouth Daily., Disp: 90 tablet, Rfl: 3    nitroglycerin (Nitrostat) 0.4 MG SL tablet, Place 1 tablet under the tongue Every 5 (Five) Minutes As Needed for Chest Pain. Take no more than 3 doses in 15 minutes., Disp: 25 tablet, Rfl: 1    oxybutynin (DITROPAN) 5 MG tablet, Take 1 tablet by mouth 2 (Two) Times a Day., Disp: 180 tablet, Rfl: 2    pantoprazole (PROTONIX) 40 MG EC tablet, Take 1 tablet by mouth 2 (Two) Times a Day., Disp: 180 tablet, Rfl: 3    pioglitazone (ACTOS) 30 MG tablet, Take 1 tablet by mouth Daily., Disp: 90 tablet, Rfl: 1    pravastatin (PRAVACHOL) 80 MG tablet, TAKE 1 TABLET BY MOUTH EVERY DAY, Disp: 90 tablet, Rfl: 2    QUEtiapine (SEROquel) 50 MG tablet, Take 0.5 tablets by mouth Every Night., Disp: 30 tablet, Rfl: 2    tiZANidine (ZANAFLEX) 4 MG tablet, TAKE 1/2 TABLET BY MOUTH 2 TIMES A DAY, Disp: 30 tablet, Rfl: 1    valsartan (DIOVAN) 160 MG tablet, Take 1 tablet by mouth Daily., Disp: 90 tablet, Rfl: 3    amLODIPine (NORVASC) 10 MG tablet, Take 1 tablet by mouth Daily., Disp: 90 tablet, Rfl: 3    denosumab (PROLIA) 60 MG/ML solution prefilled syringe syringe, Inject 1 mL under the skin into the appropriate area as directed 1 (One) Time for 1 dose.  "Repeat in 6 months1, Disp: 1 mL, Rfl: 0    latanoprost (XALATAN) 0.005 % ophthalmic solution, , Disp: , Rfl:     Allergies:   Allergies   Allergen Reactions    Ciprofloxacin Anxiety and Rash    Atorvastatin Unknown - High Severity     Other reaction(s): Leg cramps..    Rosuvastatin Calcium Unknown - Low Severity    Sulfa Antibiotics Unknown - Low Severity       Objective     Physical Exam:  Vital Signs:   Vitals:    11/25/24 1331   BP: 122/80   Pulse: 72   SpO2: 96%   Weight: 61.2 kg (135 lb)   Height: 167.6 cm (66\")   PainSc: 0-No pain     Body mass index is 21.79 kg/m².     Physical Exam  Vitals and nursing note reviewed.   Constitutional:       Appearance: Normal appearance. She is normal weight.   HENT:      Head: Normocephalic.      Right Ear: External ear normal.      Left Ear: External ear normal.      Nose: Nose normal.   Eyes:      Pupils: Pupils are equal, round, and reactive to light.   Cardiovascular:      Rate and Rhythm: Normal rate and regular rhythm.      Pulses: Normal pulses.      Heart sounds: Normal heart sounds.   Pulmonary:      Effort: Pulmonary effort is normal.      Breath sounds: Normal breath sounds.   Musculoskeletal:      Cervical back: Normal range of motion and neck supple.   Skin:     General: Skin is warm and dry.   Neurological:      Mental Status: She is alert.   Psychiatric:         Mood and Affect: Mood normal.       Physical Exam  Vital Signs  Weight is 135 pounds.    Results  Laboratory Studies  A1c is 6.5.    Procedures      Assessment / Plan      Assessment/Plan:   Diagnoses and all orders for this visit:    1. Type 2 diabetes mellitus with stage 3b chronic kidney disease, with long-term current use of insulin (Primary)  -     POC Glycosylated Hemoglobin (Hb A1C)    2. Primary hypertension  -     metoprolol succinate XL (TOPROL-XL) 50 MG 24 hr tablet; Take 1 tablet by mouth Daily.  Dispense: 90 tablet; Refill: 3    3. Weight loss    4. Chronic UTI    5. Senility    Other " orders  -     Fluzone High-Dose 65+yrs  -     donepezil (Aricept) 10 MG tablet; Take 1 tablet by mouth Every Night.  Dispense: 90 tablet; Refill: 1  -     escitalopram (LEXAPRO) 10 MG tablet; Take 1 tablet by mouth Daily.  Dispense: 90 tablet; Refill: 3  -     methenamine (HIPREX) 1 g tablet; Take 1 tablet by mouth 2 (Two) Times a Day With Meals.  Dispense: 180 tablet; Refill: 3  -     oxybutynin (DITROPAN) 5 MG tablet; Take 1 tablet by mouth 2 (Two) Times a Day.  Dispense: 180 tablet; Refill: 2  -     amLODIPine (NORVASC) 10 MG tablet; Take 1 tablet by mouth Daily.  Dispense: 90 tablet; Refill: 3       Assessment & Plan  1. Weight Loss.  She has lost 4 pounds, going from 139 to 135 pounds. She was advised to maintain her current weight and consider protein drinks and fiber shakes to help with her appetite and nutrition.    2. Decreased Appetite.  She reports not feeling hungry and not eating a whole lot. She was advised to try protein drinks and fiber shakes to help with her nutrition.    3. Diabetes Mellitus.  Her blood sugar levels are well-managed, with an A1c of 6.5. The dosage of glimepiride was adjusted to 2 tablets in the morning and 1 at night. Refills were provided for her medications, including glimepiride.    4. Bladder Symptoms.  She reports wearing Depends all day and night. No new bladder symptoms were reported.    5. Sleep Disturbances.  She wakes up two or three times during the night. She was advised to continue taking Seroquel, with the option of taking either half a pill or a whole pill.    6. Hip Pain.  Her hip is healing fine, as confirmed by a recent visit to the orthopedic specialist last week.    7. Medication Management.  Refills were provided for donepezil, hydralazine, Lexapro, oxybutynin, Hiprex, Toprol, amlodipine, and tizanidine. Valsartan will be refilled at [facility] instead of by mail.    8. Health Maintenance.  She received the influenza vaccine during the  visit.    Follow-up  Return in 3 to 4 months for follow up.    Follow Up:   Return in about 4 months (around 3/25/2025).    Patient or patient representative verbalized consent for the use of Ambient Listening during the visit with  Cale Pearson MD for chart documentation. 11/25/2024  14:02 EST     @[email or web address]

## 2024-12-06 ENCOUNTER — TELEPHONE (OUTPATIENT)
Dept: FAMILY MEDICINE CLINIC | Facility: CLINIC | Age: 84
End: 2024-12-06

## 2024-12-06 RX ORDER — QUETIAPINE FUMARATE 50 MG/1
50 TABLET, FILM COATED ORAL NIGHTLY
Qty: 30 TABLET | Refills: 2 | Status: SHIPPED | OUTPATIENT
Start: 2024-12-06

## 2024-12-06 NOTE — TELEPHONE ENCOUNTER
Caller: DHRUV WILSON    Relationship: Emergency Contact    Best call back number: [phone]     Which medication are you concerned about:   donepezil (Aricept) 10 MG tablet     Who prescribed you this medication: ALLY JUAN MD     When did you start taking this medication: 03/11/2024     What are your concerns: PATIENT'S (DAUGHTER) DHRUV STATED THAT THIS MEDICATION IS CAUSING PATIENT TO BE MORE AGITATED AND HAVE HALLUCINATION. DHRUV STATED PATIENT DID BETTER ON THE   QUEtiapine (SEROquel) 50 MG tablet   AND WOULD LIKE TO BE PLACED BACK ON THIS MEDICATION.     [facility] [address] - [phone] [facility] [phone]  [phone]

## 2024-12-12 DIAGNOSIS — E11.22 TYPE 2 DIABETES MELLITUS WITH STAGE 3B CHRONIC KIDNEY DISEASE, WITH LONG-TERM CURRENT USE OF INSULIN: ICD-10-CM

## 2024-12-12 DIAGNOSIS — Z79.4 TYPE 2 DIABETES MELLITUS WITH STAGE 3B CHRONIC KIDNEY DISEASE, WITH LONG-TERM CURRENT USE OF INSULIN: ICD-10-CM

## 2024-12-12 DIAGNOSIS — N18.32 TYPE 2 DIABETES MELLITUS WITH STAGE 3B CHRONIC KIDNEY DISEASE, WITH LONG-TERM CURRENT USE OF INSULIN: ICD-10-CM

## 2024-12-12 RX ORDER — GLIMEPIRIDE 2 MG/1
TABLET ORAL
Qty: 90 TABLET | Refills: 2 | Status: SHIPPED | OUTPATIENT
Start: 2024-12-12

## 2024-12-12 NOTE — TELEPHONE ENCOUNTER
Caller: DHRUV WILSON    Relationship: Emergency Contact    Best call back number: [phone]     Requested Prescriptions:   Requested Prescriptions     Pending Prescriptions Disp Refills    glimepiride (AMARYL) 2 MG tablet 90 tablet 0     Sig: TAKE 1 TO 3 TABLETS BY MOUTH EVERY DAY FOR DIABETES        Pharmacy where request should be sent: [facility] [address] - [phone] [facility] [phone]      Last office visit with prescribing clinician: 11/25/2024   Last telemedicine visit with prescribing clinician: Visit date not found   Next office visit with prescribing clinician: 3/25/2025     Additional details provided by patient: PT HAS 3 DAYS MEDS LEFT.    Does the patient have less than a 3 day supply:  [x] Yes  [] No    Would you like a call back once the refill request has been completed: [] Yes [x] No    If the office needs to give you a call back, can they leave a voicemail: [] Yes [x] No    Alisha Cuello Rep   12/12/24 13:08 EST

## 2025-01-23 ENCOUNTER — OFFICE VISIT (OUTPATIENT)
Dept: FAMILY MEDICINE CLINIC | Facility: CLINIC | Age: 85
End: 2025-01-23
Payer: MEDICARE

## 2025-01-23 VITALS
HEIGHT: 66 IN | DIASTOLIC BLOOD PRESSURE: 78 MMHG | WEIGHT: 133 LBS | SYSTOLIC BLOOD PRESSURE: 116 MMHG | OXYGEN SATURATION: 95 % | BODY MASS INDEX: 21.38 KG/M2 | HEART RATE: 63 BPM | TEMPERATURE: 97.8 F

## 2025-01-23 DIAGNOSIS — B99.9 CONFUSION ASSOCIATED WITH INFECTION: ICD-10-CM

## 2025-01-23 DIAGNOSIS — N30.01 ACUTE CYSTITIS WITH HEMATURIA: Primary | ICD-10-CM

## 2025-01-23 DIAGNOSIS — R41.0 CONFUSION ASSOCIATED WITH INFECTION: ICD-10-CM

## 2025-01-23 LAB
BILIRUB BLD-MCNC: NEGATIVE MG/DL
CLARITY, POC: ABNORMAL
COLOR UR: YELLOW
EXPIRATION DATE: ABNORMAL
GLUCOSE UR STRIP-MCNC: NEGATIVE MG/DL
KETONES UR QL: NEGATIVE
LEUKOCYTE EST, POC: ABNORMAL
Lab: ABNORMAL
NITRITE UR-MCNC: NEGATIVE MG/ML
PH UR: 6 [PH] (ref 5–8)
PROT UR STRIP-MCNC: ABNORMAL MG/DL
RBC # UR STRIP: ABNORMAL /UL
SP GR UR: 1.02 (ref 1–1.03)
UROBILINOGEN UR QL: ABNORMAL

## 2025-01-23 PROCEDURE — 1159F MED LIST DOCD IN RCRD: CPT | Performed by: NURSE PRACTITIONER

## 2025-01-23 PROCEDURE — 3074F SYST BP LT 130 MM HG: CPT | Performed by: NURSE PRACTITIONER

## 2025-01-23 PROCEDURE — 99214 OFFICE O/P EST MOD 30 MIN: CPT | Performed by: NURSE PRACTITIONER

## 2025-01-23 PROCEDURE — 1160F RVW MEDS BY RX/DR IN RCRD: CPT | Performed by: NURSE PRACTITIONER

## 2025-01-23 PROCEDURE — 1126F AMNT PAIN NOTED NONE PRSNT: CPT | Performed by: NURSE PRACTITIONER

## 2025-01-23 PROCEDURE — 81002 URINALYSIS NONAUTO W/O SCOPE: CPT | Performed by: NURSE PRACTITIONER

## 2025-01-23 PROCEDURE — 3078F DIAST BP <80 MM HG: CPT | Performed by: NURSE PRACTITIONER

## 2025-01-23 RX ORDER — CEFDINIR 300 MG/1
300 CAPSULE ORAL 2 TIMES DAILY
Qty: 20 CAPSULE | Refills: 0 | Status: SHIPPED | OUTPATIENT
Start: 2025-01-23

## 2025-01-23 NOTE — PROGRESS NOTES
Office Note     Name: Jennyfer Portillo    : 1940     MRN: [id number]     Chief Complaint  Urinary Tract Infection (Pt is here with her daughter due to a UTI that patient has had since the . Pt daughter states she has been to [facility] and had gotten Agumentin.) and Difficulty Urinating (Pt urine has been having a smell and looks coudy )    Subjective     History of Present Illness:  Jennyfer Portillo is a 84 y.o. female who presents today for persistent confusion associated with UTI despite treatment with antibioitcs.     History of Present Illness  The patient is an 84-year-old female who presents for evaluation of urinary tract infection (UTI). She is accompanied by her daughter.    She has been experiencing symptoms consistent with a UTI for several weeks. She reports no current discomfort, including dysuria, abdominal pain, or back pain. However, she does report persistent malaise. Her daughter corroborates this, noting that the patient frequently complains of feeling unwell without specifying the cause. The patient has not exhibited any febrile symptoms but reports a constant sensation of coldness. A recent urinalysis conducted at [facility] revealed an infection characterized by a high white blood cell count. Initially, she was prescribed Macrobid 100 mg, which she took for 4 to 5 days. Despite this treatment, her condition did not improve. Given her known resistance to many antibiotics due to lifelong exposure, her medication was switched to Augmentin 875 mg on 2025. After approximately 6 days on Augmentin, there has been no improvement in her symptoms. Her urine continues to show significant leukocyturia and hematuria, and it appears notably cloudy. It is noteworthy that her urine was not cultured at the [facility]. Her daughter expresses concern about the potential need for intravenous antibiotics. The patient typically exhibits increased irritability during severe UTIs, which  was initially observed but has since subsided following the initiation of different antibiotics. She has been maintaining hydration with Pedialyte.    ALLERGIES  The patient is allergic to CIPRO and SULFA.    MEDICATIONS  Current: Augmentin 875 mg  Past: Macrobid 100 mg      Objective     Past Medical History:   Diagnosis Date    Benign essential hypertension     Bilateral hearing loss     Carotid artery disease 1998    ANGIOPLASTY, ANOTHER STENT 2001    Chronic obstructive lung disease     Colon polyp     Depressive disorder     Diverticulosis 01/14/2016    , IN SIGMOID COLON, INTERNAL HEMMORRHOIDS, REPEAT RECOMMENDED 5 YRSOD SEVERE    GERD (gastroesophageal reflux disease)     Glaucoma     High risk medication use     Hx of myocardial infarction     Megaloblastic anemia     Migraines     Mixed hyperlipidemia     DUE TO TYPE 2 DIABETES, W. HYPERGLYCEMIA    Obesity     Osteopenia     OF BOTH HIPS/ SPINE    Pregnancy     1962,1961,1963,1958,1960    Recurrent UTI (urinary tract infection)     Type 2 diabetes mellitus     STAGE 3 CHRONIC KIDNEY DISEASE, W/O LONG  TERM USE OF CURRENT INSULIN    Vitamin D deficiency      Past Surgical History:   Procedure Laterality Date    COLONOSCOPY  11/20/2012    MODERATLEY SEVERE DIVERTICLOSIS FOUND IN THE SIGMOID COLON AND DESCENDING COLON. RECOMMENDED IN 3 YRS    ESSURE TUBAL LIGATION  1968    BILATERAL    HIP BIPOLAR REPLACEMENT Bilateral 09/2024    HYSTERECTOMY  1979    ORIF ANKLE FRACTURE Right 2005    OPEN REDUCTION INTERNAL FIXATION, SUCESSFUL OUTCOME    RETINAL DETACHMENT SURGERY  06/04/2009    REATTACHED , LEFT    UPPER GASTROINTESTINAL ENDOSCOPY       Family History   Problem Relation Age of Onset    Colon polyps Mother     Coronary artery disease Mother     Colon cancer Mother     Diabetes Mother     Hyperlipidemia Mother     Hypertension Mother     Peripheral vascular disease Mother     Colon polyps Sister     Colon cancer Sister     Colon cancer Sister        Vital  "Signs  /78 (BP Location: Left arm, Patient Position: Sitting)   Pulse 63   Temp 97.8 °F (36.6 °C) (Oral)   Ht 167.6 cm (66\")   Wt 60.3 kg (133 lb)   SpO2 95%   BMI 21.47 kg/m²   Estimated body mass index is 21.47 kg/m² as calculated from the following:    Height as of this encounter: 167.6 cm (66\").    Weight as of this encounter: 60.3 kg (133 lb).    Physical Exam  Vitals reviewed.   Constitutional:       Appearance: Normal appearance.   Cardiovascular:      Rate and Rhythm: Normal rate and regular rhythm.      Heart sounds: Murmur heard.   Pulmonary:      Effort: Pulmonary effort is normal. No respiratory distress.      Breath sounds: Normal breath sounds. No stridor. No wheezing, rhonchi or rales.   Abdominal:      General: There is no distension.      Palpations: Abdomen is soft.      Tenderness: There is no abdominal tenderness. There is no right CVA tenderness, left CVA tenderness, guarding or rebound.   Skin:     General: Skin is warm and dry.   Neurological:      Mental Status: She is alert. Mental status is at baseline.      Comments: Patient with known dementia, patient's daughter endorses increased confusion   Psychiatric:         Mood and Affect: Mood normal.         Behavior: Behavior normal.        Physical Exam  Lungs are clear.  Heart sounds are normal.  No abdominal tenderness.    Vital Signs  Blood pressure and heart rate are normal. Temperature is normal.    Results  Laboratory Studies  Urine test showed significant number of leukocytes and blood in urine.      POCT Results (if applicable):  Results for orders placed or performed in visit on 01/23/25   POC Urinalysis Dipstick    Collection Time: 01/23/25 12:00 PM    Specimen: Urine   Result Value Ref Range    Color Yellow Yellow, Straw, Dark Yellow, Adelaida    Clarity, UA Cloudy (A) Clear    Glucose, UA Negative Negative mg/dL    Bilirubin Negative Negative    Ketones, UA Negative Negative    Specific Gravity  1.020 1.005 - 1.030    " Blood, UA 2+ (A) Negative    pH, Urine 6.0 5.0 - 8.0    Protein, POC 2+ (A) Negative mg/dL    Urobilinogen, UA 0.2 E.U./dL Normal, 0.2 E.U./dL    Leukocytes Large (3+) (A) Negative    Nitrite, UA Negative Negative    Lot Number [id number]     Expiration Date 11/30/2025    Urine Culture - Urine, Urine, Clean Catch    Collection Time: 01/23/25  3:30 PM    Specimen: Urine, Clean Catch    BL   Result Value Ref Range    Urine Culture Final report     Result 1 Comment             Assessment and Plan     Diagnoses and all orders for this visit:    1. Acute cystitis with hematuria (Primary)  -     POC Urinalysis Dipstick  -     Urine Culture - Urine, Urine, Clean Catch  -     cefdinir (OMNICEF) 300 MG capsule; Take 1 capsule by mouth 2 (Two) Times a Day.  Dispense: 20 capsule; Refill: 0    2. Confusion associated with infection      Assessment & Plan  1. Urinary Tract Infection (UTI).  The patient has been experiencing increased confusion associated with UTI for almost 2 weeks. She was initially treated with Macrobid 100 mg for 4-5 days, which was ineffective. Subsequently, she was switched to Augmentin 875 mg on 07/17/2025, but it also failed to resolve the infection. Her urine still shows a significant number of leukocytes and blood, and it appears very cloudy. She is currently asymptomatic, denying dysuria/suprapubic pain/back pain, with normal vital signs and no fever. However, given her age of 84 years and the duration of the infection, there is a concern for potential sepsis. A urine culture will be sent for further analysis. She will be started on cefdinir (Omnicef), a cephalosporin antibiotic, while awaiting the culture results. The prescription for cefdinir will be sent to [facility]. She is advised to discontinue Augmentin and start cefdinir this evening. The potential side effect of diarrhea from the use of multiple antibiotics was discussed, and the use of over-the-counter probiotics was suggested if  needed. She is encouraged to maintain adequate hydration and rest. The patient's daughter will be contacted with the culture results and the subsequent treatment plan. If her condition deteriorates, characterized by the onset of fever, vomiting, back or abdominal pain, or changes in demeanor, she is advised to seek immediate medical attention at the ER.        Follow Up  Return if symptoms worsen or fail to improve.      Patient or patient representative verbalized consent for the use of Ambient Listening during the visit with  LUCY Woo for chart documentation. 1/26/2025  14:16 EST    LUCY Woo

## 2025-01-25 LAB
BACTERIA UR CULT: NORMAL
BACTERIA UR CULT: NORMAL

## 2025-01-29 ENCOUNTER — TELEPHONE (OUTPATIENT)
Dept: FAMILY MEDICINE CLINIC | Facility: CLINIC | Age: 85
End: 2025-01-29
Payer: MEDICARE

## 2025-01-29 NOTE — TELEPHONE ENCOUNTER
Spoke with swathi.   Relayed message to zion that we can recheck pt's urine 2 days after she has finished the antibiotic.    Daughter to come in an grab a urine cup.

## 2025-01-29 NOTE — TELEPHONE ENCOUNTER
Caller: DHRUV WILSON    Relationship: Emergency Contact    Best call back number: [phone]     What was the call regarding:   PATIENT'S (DAUGHTER) DHRUV STATED THAT SHE HAS FINISHED HER ANTIBIOTIC AND STILL HAS CLOUDY URINE WITH NO OTHER SYMPTOMS AND DHRUV WOULD LIKE A CALL BACK TO BE INFORMED IS SHE NEEDS TO BE CONCERNED ABOUT THIS AND IF PATIENT NEEDS MORE ANTIBIOTIC TO HELP CLEAR UP THE CLOUDY URINE OR NOT

## 2025-02-10 NOTE — TELEPHONE ENCOUNTER
Caller: DHRUV WILSON    Relationship: Emergency Contact    Best call back number: [phone]     Requested Prescriptions:   Requested Prescriptions     Pending Prescriptions Disp Refills    tiZANidine (ZANAFLEX) 4 MG tablet 30 tablet 1     Sig: Take 0.5 tablets by mouth 2 (Two) Times a Day.        Pharmacy where request should be sent: [facility] [address] - [phone] [facility] [phone]      Last office visit with prescribing clinician: 11/25/2024   Last telemedicine visit with prescribing clinician: Visit date not found   Next office visit with prescribing clinician: 3/25/2025     Additional details provided by patient:     Does the patient have less than a 3 day supply:  [x] Yes  [] No    Would you like a call back once the refill request has been completed: [] Yes [x] No    If the office needs to give you a call back, can they leave a voicemail: [] Yes [x] No    Alisha Ames Rep   02/10/25 10:21 EST

## 2025-02-14 ENCOUNTER — TELEPHONE (OUTPATIENT)
Dept: FAMILY MEDICINE CLINIC | Facility: CLINIC | Age: 85
End: 2025-02-14

## 2025-02-14 ENCOUNTER — CLINICAL SUPPORT (OUTPATIENT)
Dept: FAMILY MEDICINE CLINIC | Facility: CLINIC | Age: 85
End: 2025-02-14
Payer: MEDICARE

## 2025-02-14 DIAGNOSIS — R82.90 ABNORMAL FINDING IN URINE: ICD-10-CM

## 2025-02-14 DIAGNOSIS — R82.90 CLOUDY URINE: Primary | ICD-10-CM

## 2025-02-14 NOTE — TELEPHONE ENCOUNTER
Caller: DHRUV WILSON    Relationship: Emergency Contact    Best call back number: [phone]     What orders are you requesting (i.e. lab or imaging): URINALYSIS     In what timeframe would the patient need to come in: AS SOON AS POSSIBLE     Where will you receive your lab/imaging services: IN OFFICE     Additional notes: PATIENTS DAUGHTER IS CALLING AND STATES THE PATIENT HAS CLOUDY URINE AND DIARRHEA. THEY WOULD LIKE TO KNOW IF THEY CAN  CUPS TO GET A URINE SAMPLE.

## 2025-02-16 LAB
BACTERIA UR CULT: NORMAL
BACTERIA UR CULT: NORMAL

## 2025-02-26 RX ORDER — VALSARTAN 160 MG/1
160 TABLET ORAL DAILY
Qty: 30 TABLET | Refills: 0 | Status: SHIPPED | OUTPATIENT
Start: 2025-02-26

## 2025-02-26 NOTE — TELEPHONE ENCOUNTER
Incoming Refill Request      Medication requested (name and dose):   VALSARTAN 160 MG    Pharmacy where request should be sent: [facility]    Additional details provided by patient: COMPLETELY OUT    Best call back number: [phone]    Does the patient have less than a 3 day supply:  [] Yes  [x] No    Alisha Nava Rep  02/26/25, 09:03 EST

## 2025-02-26 NOTE — TELEPHONE ENCOUNTER
Caller: DHRUV WILSON    Relationship: Emergency Contact    Best call back number: [phone]    Requested Prescriptions:   Requested Prescriptions     Pending Prescriptions Disp Refills    valsartan (DIOVAN) 160 MG tablet 90 tablet 3     Sig: Take 1 tablet by mouth Daily.        Pharmacy where request should be sent: [facility] [address] [facility] - [phone] [facility] [phone] [facility]     Last office visit with prescribing clinician: 11/25/2024   Last telemedicine visit with prescribing clinician: Visit date not found   Next office visit with prescribing clinician: 3/25/2025     Additional details provided by patient:     Does the patient have less than a 3 day supply:  [x] Yes  [] No    Would you like a call back once the refill request has been completed: [] Yes [x] No    If the office needs to give you a call back, can they leave a voicemail: [] Yes [x] No    Alisha Navarro Rep   02/26/25 08:57 EST

## 2025-03-17 DIAGNOSIS — N18.32 TYPE 2 DIABETES MELLITUS WITH STAGE 3B CHRONIC KIDNEY DISEASE, WITH LONG-TERM CURRENT USE OF INSULIN: ICD-10-CM

## 2025-03-17 DIAGNOSIS — E11.22 TYPE 2 DIABETES MELLITUS WITH STAGE 3B CHRONIC KIDNEY DISEASE, WITH LONG-TERM CURRENT USE OF INSULIN: ICD-10-CM

## 2025-03-17 DIAGNOSIS — Z79.4 TYPE 2 DIABETES MELLITUS WITH STAGE 3B CHRONIC KIDNEY DISEASE, WITH LONG-TERM CURRENT USE OF INSULIN: ICD-10-CM

## 2025-03-17 RX ORDER — GLIMEPIRIDE 2 MG/1
TABLET ORAL
Qty: 90 TABLET | Refills: 0 | Status: SHIPPED | OUTPATIENT
Start: 2025-03-17

## 2025-03-17 NOTE — TELEPHONE ENCOUNTER
Caller: DHRUV WILSON    Relationship: Emergency Contact    Best call back number: [phone]    Requested Prescriptions:   Requested Prescriptions     Pending Prescriptions Disp Refills    glimepiride (AMARYL) 2 MG tablet [Pharmacy Med Name: GLIMEPIRIDE 2MG] 90 tablet 1     Sig: TAKE 1 TO 3 TABLETS BY MOUTH EVERY DAY FOR DIABETES        Pharmacy where request should be sent: [facility] [address] [facility] [phone] [facility] [phone]      Last office visit with prescribing clinician: 11/25/2024   Last telemedicine visit with prescribing clinician: Visit date not found   Next office visit with prescribing clinician: 3/25/2025     Additional details provided by patient: PATIENT IS COMPLETELY OUT    Does the patient have less than a 3 day supply:  [x] Yes  [] No    Would you like a call back once the refill request has been completed: [] Yes [x] No    If the office needs to give you a call back, can they leave a voicemail: [] Yes [x] No    Alisha Saldivar Rep   03/17/25 12:56 EDT

## 2025-03-25 ENCOUNTER — OFFICE VISIT (OUTPATIENT)
Dept: FAMILY MEDICINE CLINIC | Facility: CLINIC | Age: 85
End: 2025-03-25
Payer: MEDICARE

## 2025-03-25 VITALS
HEIGHT: 66 IN | DIASTOLIC BLOOD PRESSURE: 64 MMHG | WEIGHT: 125 LBS | BODY MASS INDEX: 20.09 KG/M2 | OXYGEN SATURATION: 98 % | HEART RATE: 78 BPM | SYSTOLIC BLOOD PRESSURE: 120 MMHG

## 2025-03-25 DIAGNOSIS — E55.9 VITAMIN D DEFICIENCY: ICD-10-CM

## 2025-03-25 DIAGNOSIS — I10 PRIMARY HYPERTENSION: ICD-10-CM

## 2025-03-25 DIAGNOSIS — N39.0 CHRONIC UTI: ICD-10-CM

## 2025-03-25 DIAGNOSIS — R63.4 WEIGHT LOSS: ICD-10-CM

## 2025-03-25 DIAGNOSIS — I25.2 HISTORY OF MYOCARDIAL INFARCTION: ICD-10-CM

## 2025-03-25 DIAGNOSIS — E11.22 TYPE 2 DIABETES MELLITUS WITH STAGE 3B CHRONIC KIDNEY DISEASE, WITH LONG-TERM CURRENT USE OF INSULIN: Primary | ICD-10-CM

## 2025-03-25 DIAGNOSIS — I10 BENIGN ESSENTIAL HYPERTENSION: ICD-10-CM

## 2025-03-25 DIAGNOSIS — D53.1 MEGALOBLASTIC ANEMIA: ICD-10-CM

## 2025-03-25 DIAGNOSIS — N18.32 TYPE 2 DIABETES MELLITUS WITH STAGE 3B CHRONIC KIDNEY DISEASE, WITH LONG-TERM CURRENT USE OF INSULIN: Primary | ICD-10-CM

## 2025-03-25 DIAGNOSIS — Z79.4 TYPE 2 DIABETES MELLITUS WITH STAGE 3B CHRONIC KIDNEY DISEASE, WITH LONG-TERM CURRENT USE OF INSULIN: Primary | ICD-10-CM

## 2025-03-25 DIAGNOSIS — R53.83 OTHER FATIGUE: ICD-10-CM

## 2025-03-25 RX ORDER — DOCUSATE SODIUM 250 MG
250 CAPSULE ORAL DAILY
Qty: 90 CAPSULE | Refills: 3 | Status: SHIPPED | OUTPATIENT
Start: 2025-03-25

## 2025-03-25 NOTE — PROGRESS NOTES
Follow Up Office Visit      Date of Visit:  2025   Patient Name: Jennyfer Portillo  : 1940   MRN: [id number]     Chief Complaint:    Chief Complaint   Patient presents with   • Med Refill     Pt has lost almost 10 lbs since january   • Constipation     I suggested premier protein shakes 30 grams of protein. But pt can get constipated       History of Present Illness: Jennyfer Portillo is a 85 y.o. female who is here today for follow up.    History of Present Illness  The patient presents for evaluation of weight loss, diabetes, hypertension, allergies, constipation, and suspected UTI. She is accompanied by her daughters.    She has been experiencing weight loss, despite being provided with protein drinks twice daily and a high-protein diet. She occasionally experiences difficulty swallowing food, leading to choking incidents. She has been observed to forget to eat and drink. She has a preference for soda, which is being diluted with ice to encourage fluid intake. She has a caregiver who assists her in getting out of bed by 9:00 AM. She typically sleeps from 8:00 PM to 7:00 AM. She is inquiring about the possibility of hospice care for medical support. She has lost 10 pounds in the past 8 weeks.    Her blood glucose levels have been fluctuating since discontinuing an antibiotic course. She has not experienced hypoglycemic episodes recently, with her highest recorded blood glucose level being around 320, and it generally remains above 179. Her current medication regimen includes glyburide, Actos, and metformin for elevated blood glucose levels.    Her blood pressure has also been inconsistent. She experienced a significant drop in blood pressure to 93/50, prompting a call to emergency services. She is seeking advice on when to seek emergency care for low blood pressure readings. She  and hydralazine 10 mg. Been d/c d    She has been experiencing mild coughing and sneezing, which are attributed to  allergies.    Her stools are light in color, and she has not been receiving iron supplements. She believes her muscle weakness may be contributing to her constipation. She attempts to use a walker for mobility.    There is uncertainty regarding a potential urinary tract infection (UTI). They suspect that she may not have fully recovered from a previous severe UTI. They have been informed that cloudy urine could be a sign of dehydration.    MEDICATIONS  Current: glyburide, pioglitazone, metformin, Seroquel  Discontinued: Norvasc, hydralazine      Subjective      Review of Systems:   Review of Systems    Past Medical History:   Past Medical History:   Diagnosis Date   • Benign essential hypertension    • Bilateral hearing loss    • Carotid artery disease 1998    ANGIOPLASTY, ANOTHER STENT 2001   • Chronic obstructive lung disease    • Colon polyp    • Depressive disorder    • Diverticulosis 01/14/2016    , IN SIGMOID COLON, INTERNAL HEMMORRHOIDS, REPEAT RECOMMENDED 5 YRSOD SEVERE   • GERD (gastroesophageal reflux disease)    • Glaucoma    • High risk medication use    • Hx of myocardial infarction    • Megaloblastic anemia    • Migraines    • Mixed hyperlipidemia     DUE TO TYPE 2 DIABETES, W. HYPERGLYCEMIA   • Obesity    • Osteopenia     OF BOTH HIPS/ SPINE   • Pregnancy     1962,1961,1963,1958,1960   • Recurrent UTI (urinary tract infection)    • Type 2 diabetes mellitus     STAGE 3 CHRONIC KIDNEY DISEASE, W/O LONG  TERM USE OF CURRENT INSULIN   • Vitamin D deficiency        Past Surgical History:   Past Surgical History:   Procedure Laterality Date   • COLONOSCOPY  11/20/2012    MODERATLEY SEVERE DIVERTICLOSIS FOUND IN THE SIGMOID COLON AND DESCENDING COLON. RECOMMENDED IN 3 YRS   • ESSURE TUBAL LIGATION  1968    BILATERAL   • HIP BIPOLAR REPLACEMENT Bilateral 09/2024   • HYSTERECTOMY  1979   • ORIF ANKLE FRACTURE Right 2005    OPEN REDUCTION INTERNAL FIXATION, SUCESSFUL OUTCOME   • RETINAL DETACHMENT SURGERY   2009    REATTACHED , LEFT   • UPPER GASTROINTESTINAL ENDOSCOPY         Family History:   Family History   Problem Relation Age of Onset   • Colon polyps Mother    • Coronary artery disease Mother    • Colon cancer Mother    • Diabetes Mother    • Hyperlipidemia Mother    • Hypertension Mother    • Peripheral vascular disease Mother    • Colon polyps Sister    • Colon cancer Sister    • Colon cancer Sister        Social History:   Social History     Socioeconomic History   • Marital status:    • Number of children: 5   • Highest education level: 12th grade   Tobacco Use   • Smoking status: Former     Current packs/day: 0.00     Average packs/day: 0.3 packs/day for 5.0 years (1.3 ttl pk-yrs)     Types: Cigarettes     Start date:      Quit date:      Years since quittin.2     Passive exposure: Past   • Smokeless tobacco: Never   Vaping Use   • Vaping status: Never Used   Substance and Sexual Activity   • Alcohol use: Not Currently   • Drug use: Defer   • Sexual activity: Defer       Medications:     Current Outpatient Medications:   •  amLODIPine (NORVASC) 10 MG tablet, Take 1 tablet by mouth Daily., Disp: 90 tablet, Rfl: 3  •  brimonidine-timolol (COMBIGAN) 0.2-0.5 % ophthalmic solution, 1 drop., Disp: , Rfl:   •  escitalopram (LEXAPRO) 10 MG tablet, Take 1 tablet by mouth Daily., Disp: 90 tablet, Rfl: 3  •  glimepiride (AMARYL) 2 MG tablet, TAKE 1 TO 3 TABLETS BY MOUTH EVERY DAY FOR DIABETES, Disp: 90 tablet, Rfl: 0  •  hydrOXYzine pamoate (VISTARIL) 25 MG capsule, Take 1 capsule by mouth 3 (Three) Times a Day As Needed for Anxiety., Disp: 90 capsule, Rfl: 2  •  latanoprost (XALATAN) 0.005 % ophthalmic solution, , Disp: , Rfl:   •  methenamine (HIPREX) 1 g tablet, Take 1 tablet by mouth 2 (Two) Times a Day With Meals., Disp: 180 tablet, Rfl: 3  •  metoprolol succinate XL (TOPROL-XL) 50 MG 24 hr tablet, Take 1 tablet by mouth Daily., Disp: 90 tablet, Rfl: 3  •  nitroglycerin (Nitrostat) 0.4  "MG SL tablet, Place 1 tablet under the tongue Every 5 (Five) Minutes As Needed for Chest Pain. Take no more than 3 doses in 15 minutes., Disp: 25 tablet, Rfl: 1  •  oxybutynin (DITROPAN) 5 MG tablet, Take 1 tablet by mouth 2 (Two) Times a Day., Disp: 180 tablet, Rfl: 2  •  pantoprazole (PROTONIX) 40 MG EC tablet, Take 1 tablet by mouth 2 (Two) Times a Day., Disp: 180 tablet, Rfl: 3  •  pioglitazone (ACTOS) 30 MG tablet, Take 1 tablet by mouth Daily., Disp: 90 tablet, Rfl: 1  •  pravastatin (PRAVACHOL) 80 MG tablet, TAKE 1 TABLET BY MOUTH EVERY DAY, Disp: 90 tablet, Rfl: 2  •  QUEtiapine (SEROquel) 50 MG tablet, Take 1 tablet by mouth Every Night. D/c the donepezil, Disp: 30 tablet, Rfl: 2  •  tiZANidine (ZANAFLEX) 4 MG tablet, Take 0.5 tablets by mouth 2 (Two) Times a Day., Disp: 30 tablet, Rfl: 0  •  valsartan (DIOVAN) 160 MG tablet, Take 1 tablet by mouth Daily., Disp: 30 tablet, Rfl: 0  •  docusate sodium (COLACE) 250 MG capsule, Take 1 capsule by mouth Daily., Disp: 90 capsule, Rfl: 3    Allergies:   Allergies   Allergen Reactions   • Ciprofloxacin Anxiety and Rash   • Atorvastatin Unknown - High Severity     Other reaction(s): Leg cramps..   • Rosuvastatin Calcium Unknown - Low Severity   • Sulfa Antibiotics Unknown - Low Severity       Objective     Physical Exam:  Vital Signs:   Vitals:    03/25/25 1322   BP: 120/64   Pulse: 78   SpO2: 98%   Weight: 56.7 kg (125 lb)   Height: 167.6 cm (66\")     Body mass index is 20.18 kg/m².     Physical Exam  Vitals and nursing note reviewed.   Constitutional:       Appearance: Normal appearance. She is normal weight.   HENT:      Head: Normocephalic.      Right Ear: External ear normal.      Left Ear: External ear normal.      Nose: Nose normal.   Eyes:      Pupils: Pupils are equal, round, and reactive to light.   Neck:      Vascular: No carotid bruit.   Cardiovascular:      Rate and Rhythm: Normal rate and regular rhythm.      Pulses: Normal pulses.      Heart sounds: " Normal heart sounds.   Pulmonary:      Effort: Pulmonary effort is normal.      Breath sounds: Normal breath sounds.   Musculoskeletal:      Cervical back: Normal range of motion and neck supple.   Skin:     General: Skin is warm and dry.   Neurological:      Mental Status: She is alert.   Psychiatric:         Mood and Affect: Mood normal.       Physical Exam  Vital Signs  Blood pressure was 85.    Results  Laboratory Studies  Highest blood sugar was 320 and it has been staying around 179 and above.    Procedures      Assessment / Plan      Assessment/Plan:   Diagnoses and all orders for this visit:    1. Type 2 diabetes mellitus with stage 3b chronic kidney disease, with long-term current use of insulin (Primary)  -     POC Urinalysis Dipstick, Automated  -     POC Albumin/Creatinine Ratio Urine  -     Hemoglobin A1c    2. Weight loss  -     Ambulatory Referral to Home Health    3. Primary hypertension  -     Comprehensive Metabolic Panel  -     Ambulatory Referral to Home Health    4. Vitamin D deficiency  -     Vitamin D,25-Hydroxy  -     Ambulatory Referral to Home Health    5. Megaloblastic anemia  -     Vitamin B12 & Folate    6. Other fatigue  -     CBC & Differential  -     TSH    7. Chronic UTI    8. Benign essential hypertension    9. History of myocardial infarction    Other orders  -     docusate sodium (COLACE) 250 MG capsule; Take 1 capsule by mouth Daily.  Dispense: 90 capsule; Refill: 3       Assessment & Plan  1. Weight loss.  She has experienced a weight loss of 10 pounds over the past 8 weeks. Home health services will be initiated to provide additional support.    2. Diabetes Mellitus.  Her blood glucose levels have been fluctuating, with recent highs around 320 mg/dL and lows around 179 mg/dL. The current medication regimen includes glyburide and Actos (pioglitazone). She will continue taking glyburide 2 tablets in the morning and 1 at night. . Metformin will be used if her sugar levels are too  high.    3. Hypertension.  Her blood pressure has been unstable, with recent episodes of low readings such as 93/50 mmHg. Hydralazine 10 mg will be discontinued. She will take Apresoline 10 mg, 1 tablet by mouth every 8 hours as needed for systolic blood pressure greater than 150 mmHg.    4. Allergies.  She has been experiencing sneezing and coughing, likely due to pollen and other allergens.    5. Constipation.  She will use a Dulcolax suppository as needed for constipation relief.    6. Suspected urinary tract infection (UTI).  There is a concern that she may not have fully recovered from a previous UTI. A urine test will be conducted to check for any signs of infection or dehydration.    Follow Up:   No follow-ups on file.    Patient or patient representative verbalized consent for the use of Ambient Listening during the visit with  Cale Pearson MD for chart documentation. 3/25/2025  16:17 EDT     @[facility] [place]

## 2025-03-26 LAB
25(OH)D3+25(OH)D2 SERPL-MCNC: 32.4 NG/ML (ref 30–100)
ALBUMIN SERPL-MCNC: 4.1 G/DL (ref 3.7–4.7)
ALP SERPL-CCNC: 113 IU/L (ref 44–121)
ALT SERPL-CCNC: 20 IU/L (ref 0–32)
AST SERPL-CCNC: 35 IU/L (ref 0–40)
BASOPHILS # BLD AUTO: 0 X10E3/UL (ref 0–0.2)
BASOPHILS NFR BLD AUTO: 0 %
BILIRUB SERPL-MCNC: <0.2 MG/DL (ref 0–1.2)
BUN SERPL-MCNC: 36 MG/DL (ref 8–27)
BUN/CREAT SERPL: 26 (ref 12–28)
CALCIUM SERPL-MCNC: 9.9 MG/DL (ref 8.7–10.3)
CHLORIDE SERPL-SCNC: 106 MMOL/L (ref 96–106)
CO2 SERPL-SCNC: 21 MMOL/L (ref 20–29)
CREAT SERPL-MCNC: 1.37 MG/DL (ref 0.57–1)
EGFRCR SERPLBLD CKD-EPI 2021: 38 ML/MIN/1.73
EOSINOPHIL # BLD AUTO: 0.3 X10E3/UL (ref 0–0.4)
EOSINOPHIL NFR BLD AUTO: 3 %
ERYTHROCYTE [DISTWIDTH] IN BLOOD BY AUTOMATED COUNT: 13.9 % (ref 11.7–15.4)
FOLATE SERPL-MCNC: 9.1 NG/ML
GLOBULIN SER CALC-MCNC: 2.2 G/DL (ref 1.5–4.5)
GLUCOSE SERPL-MCNC: 324 MG/DL (ref 70–99)
HBA1C MFR BLD: 8.6 % (ref 4.8–5.6)
HCT VFR BLD AUTO: 35.1 % (ref 34–46.6)
HGB BLD-MCNC: 11.3 G/DL (ref 11.1–15.9)
IMM GRANULOCYTES # BLD AUTO: 0 X10E3/UL (ref 0–0.1)
IMM GRANULOCYTES NFR BLD AUTO: 0 %
LYMPHOCYTES # BLD AUTO: 1.4 X10E3/UL (ref 0.7–3.1)
LYMPHOCYTES NFR BLD AUTO: 14 %
MCH RBC QN AUTO: 30.5 PG (ref 26.6–33)
MCHC RBC AUTO-ENTMCNC: 32.2 G/DL (ref 31.5–35.7)
MCV RBC AUTO: 95 FL (ref 79–97)
MONOCYTES # BLD AUTO: 0.7 X10E3/UL (ref 0.1–0.9)
MONOCYTES NFR BLD AUTO: 7 %
NEUTROPHILS # BLD AUTO: 7.3 X10E3/UL (ref 1.4–7)
NEUTROPHILS NFR BLD AUTO: 76 %
PLATELET # BLD AUTO: 335 X10E3/UL (ref 150–450)
POTASSIUM SERPL-SCNC: 4.7 MMOL/L (ref 3.5–5.2)
PROT SERPL-MCNC: 6.3 G/DL (ref 6–8.5)
RBC # BLD AUTO: 3.71 X10E6/UL (ref 3.77–5.28)
SODIUM SERPL-SCNC: 142 MMOL/L (ref 134–144)
TSH SERPL DL<=0.005 MIU/L-ACNC: 3.25 UIU/ML (ref 0.45–4.5)
VIT B12 SERPL-MCNC: 589 PG/ML (ref 232–1245)
WBC # BLD AUTO: 9.7 X10E3/UL (ref 3.4–10.8)

## 2025-03-27 ENCOUNTER — TRANSCRIBE ORDERS (OUTPATIENT)
Dept: HOME HEALTH SERVICES | Facility: HOME HEALTHCARE | Age: 85
End: 2025-03-27
Payer: MEDICARE

## 2025-03-27 DIAGNOSIS — E55.9 VITAMIN D INSUFFICIENCY: ICD-10-CM

## 2025-03-27 DIAGNOSIS — R63.4 LOSS OF WEIGHT: Primary | ICD-10-CM

## 2025-03-27 DIAGNOSIS — I10 PRIMARY HYPERTENSION: ICD-10-CM

## 2025-03-28 DIAGNOSIS — K62.5 RECTAL BLEEDING: ICD-10-CM

## 2025-03-28 RX ORDER — VALSARTAN 160 MG/1
160 TABLET ORAL DAILY
Qty: 30 TABLET | Refills: 0 | Status: SHIPPED | OUTPATIENT
Start: 2025-03-28

## 2025-03-28 RX ORDER — QUETIAPINE FUMARATE 50 MG/1
25 TABLET, FILM COATED ORAL
Qty: 30 TABLET | Refills: 0 | Status: SHIPPED | OUTPATIENT
Start: 2025-03-28

## 2025-03-28 RX ORDER — HYDROXYZINE PAMOATE 25 MG/1
CAPSULE ORAL
Qty: 30 CAPSULE | Refills: 0 | Status: SHIPPED | OUTPATIENT
Start: 2025-03-28

## 2025-03-28 RX ORDER — DONEPEZIL HYDROCHLORIDE 10 MG/1
10 TABLET, FILM COATED ORAL
Qty: 30 TABLET | Refills: 0 | Status: SHIPPED | OUTPATIENT
Start: 2025-03-28

## 2025-03-28 RX ORDER — PIOGLITAZONE 30 MG/1
30 TABLET ORAL DAILY
Qty: 30 TABLET | Refills: 0 | Status: SHIPPED | OUTPATIENT
Start: 2025-03-28

## 2025-04-08 ENCOUNTER — OUTSIDE FACILITY SERVICE (OUTPATIENT)
Dept: FAMILY MEDICINE CLINIC | Facility: CLINIC | Age: 85
End: 2025-04-08
Payer: MEDICARE

## 2025-04-21 DIAGNOSIS — E11.22 TYPE 2 DIABETES MELLITUS WITH STAGE 3B CHRONIC KIDNEY DISEASE, WITH LONG-TERM CURRENT USE OF INSULIN: ICD-10-CM

## 2025-04-21 DIAGNOSIS — Z79.4 TYPE 2 DIABETES MELLITUS WITH STAGE 3B CHRONIC KIDNEY DISEASE, WITH LONG-TERM CURRENT USE OF INSULIN: ICD-10-CM

## 2025-04-21 DIAGNOSIS — N18.32 TYPE 2 DIABETES MELLITUS WITH STAGE 3B CHRONIC KIDNEY DISEASE, WITH LONG-TERM CURRENT USE OF INSULIN: ICD-10-CM

## 2025-04-21 RX ORDER — GLIMEPIRIDE 2 MG/1
TABLET ORAL
Qty: 90 TABLET | Refills: 0 | Status: SHIPPED | OUTPATIENT
Start: 2025-04-21

## 2025-04-22 RX ORDER — HYDROXYZINE PAMOATE 25 MG/1
CAPSULE ORAL
Qty: 60 CAPSULE | Refills: 2 | Status: SHIPPED | OUTPATIENT
Start: 2025-04-22

## 2025-04-28 ENCOUNTER — OUTSIDE FACILITY SERVICE (OUTPATIENT)
Dept: FAMILY MEDICINE CLINIC | Facility: CLINIC | Age: 85
End: 2025-04-28
Payer: MEDICARE

## 2025-04-28 ENCOUNTER — OFFICE VISIT (OUTPATIENT)
Dept: FAMILY MEDICINE CLINIC | Facility: CLINIC | Age: 85
End: 2025-04-28
Payer: MEDICARE

## 2025-04-28 VITALS
OXYGEN SATURATION: 98 % | HEIGHT: 66 IN | HEART RATE: 74 BPM | WEIGHT: 113 LBS | BODY MASS INDEX: 18.16 KG/M2 | DIASTOLIC BLOOD PRESSURE: 80 MMHG | SYSTOLIC BLOOD PRESSURE: 118 MMHG

## 2025-04-28 DIAGNOSIS — Z00.00 ENCOUNTER FOR ANNUAL WELLNESS VISIT: Primary | ICD-10-CM

## 2025-04-28 DIAGNOSIS — N39.0 RECURRENT UTI: ICD-10-CM

## 2025-04-28 DIAGNOSIS — R41.81 SENILITY: ICD-10-CM

## 2025-04-28 DIAGNOSIS — K62.5 RECTAL BLEEDING: ICD-10-CM

## 2025-04-28 DIAGNOSIS — I10 PRIMARY HYPERTENSION: ICD-10-CM

## 2025-04-28 DIAGNOSIS — F32.A DEPRESSIVE DISORDER: ICD-10-CM

## 2025-04-28 DIAGNOSIS — E78.2 MIXED HYPERLIPIDEMIA: ICD-10-CM

## 2025-04-28 LAB
BILIRUB BLD-MCNC: NEGATIVE MG/DL
CLARITY, POC: ABNORMAL
COLOR UR: ABNORMAL
EXPIRATION DATE: ABNORMAL
GLUCOSE UR STRIP-MCNC: NEGATIVE MG/DL
KETONES UR QL: NEGATIVE
LEUKOCYTE EST, POC: ABNORMAL
Lab: ABNORMAL
NITRITE UR-MCNC: NEGATIVE MG/ML
PH UR: 6 [PH] (ref 5–8)
PROT UR STRIP-MCNC: ABNORMAL MG/DL
RBC # UR STRIP: ABNORMAL /UL
SP GR UR: 1.02 (ref 1–1.03)
UROBILINOGEN UR QL: ABNORMAL

## 2025-04-28 RX ORDER — PIOGLITAZONE 30 MG/1
30 TABLET ORAL DAILY
Qty: 30 TABLET | Refills: 1 | Status: SHIPPED | OUTPATIENT
Start: 2025-04-28

## 2025-04-28 RX ORDER — QUETIAPINE FUMARATE 50 MG/1
25 TABLET, FILM COATED ORAL
Qty: 30 TABLET | Refills: 0 | Status: SHIPPED | OUTPATIENT
Start: 2025-04-28

## 2025-04-28 NOTE — PROGRESS NOTES
Subjective   The ABCs of the Annual Wellness Visit  Medicare Wellness Visit      Jennyfer Portillo is a 85 y.o. patient who presents for a Medicare Wellness Visit.    The following portions of the patient's history were reviewed and   updated as appropriate: allergies, current medications, past family history, past medical history, past social history, past surgical history, and problem list.    Compared to one year ago, the patient's physical   health is worse.  Compared to one year ago, the patient's mental   health is worse.    Recent Hospitalizations:  She was not admitted to the hospital during the last year.     Current Medical Providers:  Patient Care Team:  Cale Pearson MD as PCP - General (Family Medicine)  Leida Monterroso PA-C as Physician Assistant (Gastroenterology)  Roney Johnson MD as Consulting Physician (Gastroenterology)    Outpatient Medications Prior to Visit   Medication Sig Dispense Refill    amLODIPine (NORVASC) 10 MG tablet Take 1 tablet by mouth Daily. 90 tablet 3    brimonidine-timolol (COMBIGAN) 0.2-0.5 % ophthalmic solution 1 drop.      docusate sodium (COLACE) 250 MG capsule Take 1 capsule by mouth Daily. 90 capsule 3    donepezil (ARICEPT) 10 MG tablet TAKE 1 TABLET BY MOUTH EVERY NIGHT AT BEDTIME 30 tablet 0    escitalopram (LEXAPRO) 10 MG tablet Take 1 tablet by mouth Daily. 90 tablet 3    glimepiride (AMARYL) 2 MG tablet TAKE 1 TO 3 TABLETS BY MOUTH EVERY DAY FOR DIABETES 90 tablet 0    hydrOXYzine pamoate (VISTARIL) 25 MG capsule TAKE 1 CAPSULE BY MOUTH 3 TIMES DAILY AS NEEDED FOR ANXIETY 60 capsule 2    latanoprost (XALATAN) 0.005 % ophthalmic solution       methenamine (HIPREX) 1 g tablet Take 1 tablet by mouth 2 (Two) Times a Day With Meals. 180 tablet 3    metoprolol succinate XL (TOPROL-XL) 50 MG 24 hr tablet Take 1 tablet by mouth Daily. 90 tablet 3    nitroglycerin (Nitrostat) 0.4 MG SL tablet Place 1 tablet under the tongue Every 5 (Five) Minutes As Needed for  Chest Pain. Take no more than 3 doses in 15 minutes. 25 tablet 1    oxybutynin (DITROPAN) 5 MG tablet Take 1 tablet by mouth 2 (Two) Times a Day. 180 tablet 2    pantoprazole (PROTONIX) 40 MG EC tablet Take 1 tablet by mouth 2 (Two) Times a Day. 180 tablet 3    pioglitazone (ACTOS) 30 MG tablet TAKE 1 TABLET BY MOUTH ONCE EVERY DAY 30 tablet 0    pravastatin (PRAVACHOL) 80 MG tablet TAKE 1 TABLET BY MOUTH EVERY DAY 90 tablet 2    QUEtiapine (SEROquel) 50 MG tablet TAKE 1/2 TABLET BY MOUTH EVERY NIGHT AT BEDTIME 30 tablet 0    tiZANidine (ZANAFLEX) 4 MG tablet TAKE 1/2 TABLET BY MOUTH 2 TIMES A DAY 30 tablet 0    valsartan (DIOVAN) 160 MG tablet TAKE 1 TABLET BY MOUTH ONCE DAILY 30 tablet 0     No facility-administered medications prior to visit.     No opioid medication identified on active medication list. I have reviewed chart for other potential  high risk medication/s and harmful drug interactions in the elderly.      Aspirin is not on active medication list.  Aspirin use is not indicated based on review of current medical condition/s. Risk of harm outweighs potential benefits.  .    Patient Active Problem List   Diagnosis    Rectal bleeding    Chronic UTI    Chronic low back pain    Diabetes mellitus    Abnormal CT scan, gallbladder    Weight loss    Stones common duct    Right upper quadrant abdominal pain    Hypertension    Elevated LFTs    Elevated CEA    Bilateral hearing loss    Benign essential hypertension    Vitamin D deficiency    Osteopenia    Megaloblastic anemia    History of myocardial infarction    Encounter for long-term (current) use of other medications    Depressive disorder    Chronic obstructive lung disease    Mixed hyperlipidemia    Compression fracture of T12 vertebra    Gastroesophageal reflux disease without esophagitis    Encounter for subsequent annual wellness visit (AWV) in Medicare patient    At high risk for falls    Senility     Advance Care Planning Advance Directive is on file.  " ACP discussion was declined by the patient. Patient has an advance directive in EMR which is still valid.             Objective   Vitals:    25 1535   BP: 118/80   Pulse: 74   SpO2: 98%   Weight: 51.3 kg (113 lb)   Height: 167.6 cm (66\")       Estimated body mass index is 18.24 kg/m² as calculated from the following:    Height as of this encounter: 167.6 cm (66\").    Weight as of this encounter: 51.3 kg (113 lb).    BMI is below normal parameters (malnutrition). Recommendations: none (medical contraindication)           Does the patient have evidence of cognitive impairment? Yes  Lab Results   Component Value Date    HGBA1C 8.6 (H) 2025                                                                                                Health  Risk Assessment    Smoking Status:  Social History     Tobacco Use   Smoking Status Former    Current packs/day: 0.00    Average packs/day: 0.3 packs/day for 5.0 years (1.3 ttl pk-yrs)    Types: Cigarettes    Start date:     Quit date:     Years since quittin.3    Passive exposure: Past   Smokeless Tobacco Never     Alcohol Consumption:  Social History     Substance and Sexual Activity   Alcohol Use Not Currently       Fall Risk Screen  STEADI Fall Risk Assessment was completed, and patient is at MODERATE risk for falls. Assessment completed on:2025    Depression Screening   Little interest or pleasure in doing things? Not at all   Feeling down, depressed, or hopeless? Not at all   PHQ-2 Total Score 0      Health Habits and Functional and Cognitive Screenin/28/2025     3:32 PM   Functional & Cognitive Status   Do you have difficulty preparing food and eating? Yes   Do you have difficulty bathing yourself, getting dressed or grooming yourself? Yes   Do you have difficulty using the toilet? Yes   Do you have difficulty moving around from place to place? Yes   Do you have trouble with steps or getting out of a bed or a chair? Yes   Current Diet " Well Balanced Diet   Dental Exam Up to date   Eye Exam Up to date   Exercise (times per week) 0 times per week   Current Exercises Include No Regular Exercise   Do you need help using the phone?  Yes   Are you deaf or do you have serious difficulty hearing?  Yes   Do you need help to go to places out of walking distance? Yes   Do you need help shopping? Yes   Do you need help preparing meals?  Yes   Do you need help with housework?  Yes   Do you need help with laundry? Yes   Do you need help taking your medications? Yes   Do you need help managing money? Yes   Do you ever drive or ride in a car without wearing a seat belt? No   Have you felt unusual stress, anger or loneliness in the last month? Yes   Who do you live with? Child   If you need help, do you have trouble finding someone available to you? No   Have you been bothered in the last four weeks by sexual problems? No   Do you have difficulty concentrating, remembering or making decisions? Yes           Age-appropriate Screening Schedule:  Refer to the list below for future screening recommendations based on patient's age, sex and/or medical conditions. Orders for these recommended tests are listed in the plan section. The patient has been provided with a written plan.    Health Maintenance List  Health Maintenance   Topic Date Due    DIABETIC FOOT EXAM  Never done    URINE MICROALBUMIN-CREATININE RATIO (uACR)  Never done    ZOSTER VACCINE (1 of 2) Never done    RSV Vaccine - Adults (1 - 1-dose 75+ series) Never done    DIABETIC EYE EXAM  12/03/2022    ANNUAL WELLNESS VISIT  04/23/2025    LIPID PANEL  04/23/2025    COVID-19 Vaccine (5 - 2024-25 season) 09/21/2025 (Originally 9/1/2024)    INFLUENZA VACCINE  07/01/2025    HEMOGLOBIN A1C  09/25/2025    DXA SCAN  02/06/2026    TDAP/TD VACCINES (3 - Tdap) 04/02/2026    Pneumococcal Vaccine 50+  Completed                                                                                                                                                 CMS Preventative Services Quick Reference  Risk Factors Identified During Encounter  None Identified    The above risks/problems have been discussed with the patient.  Pertinent information has been shared with the patient in the After Visit Summary.  An After Visit Summary and PPPS were made available to the patient.    Follow Up:   Next Medicare Wellness visit to be scheduled in 1 year.         Additional E&M Note during same encounter follows:  Patient has additional, significant, and separately identifiable condition(s)/problem(s) that require work above and beyond the Medicare Wellness Visit     Chief Complaint  Medicare Wellness-subsequent (physical)    Subjective    HPI         The patient presents for evaluation of a potential bladder infection, dysphagia, sleep disturbances, weight loss, and elevated blood glucose levels. She is accompanied by her daughter.    Urinalysis revealed 2+ leukocytes and 2+ blood, and a urine culture is pending to confirm the presence of infection.     Difficulty swallowing has been reported, even with the administration of crushed medication in applesauce or yogurt. This has led to instances of medication non-compliance, where she conceals the medication in her mouth and later discards it. She expresses a preference for strawberry yogurt but finds it challenging to locate sugar-free options.    Her sleep pattern is irregular, with frequent awakenings at night. She uses the bathroom independently and utilizes pull-ups. She was previously on Seroquel, which induced excessive sleepiness, leading to its discontinuation. Despite this, she continues to experience significant daytime sleepiness, often sleeping until late afternoon. She is currently on a half-dose of tizanidine, and there is a consideration to increase this to a full dose to promote uninterrupted sleep.    An additional 13 pounds have been lost over the past month due to decreased appetite.  "Attempts have been made to supplement her diet with protein drinks and fish, which she enjoys. Blood glucose levels have been consistently elevated, with a recent reading of 197. On one occasion, her blood glucose level exceeded 300, necessitating the administration of metformin to bring it down. Fatigue is reported, and she expresses a desire to sleep more. Limited interest in activities such as watching TV, reading, quilting, puzzles, and stress balls is noted. She recognizes her family members and resides with her daughter, who has employed a caregiver to assist during the day. An adjustable base bed is used, and there is a consideration to install rails for safety. She also exhibits a tendency to bruise easily.    A lesion on her wrist is frequently manipulated. Initially suspected to be a wart, topical treatment was attempted without success. A similar lesion was present on her leg, which she did not manipulate, and it has since resolved.          Objective   Vital Signs:  /80   Pulse 74   Ht 167.6 cm (66\")   Wt 51.3 kg (113 lb)   SpO2 98%   BMI 18.24 kg/m²   Physical Exam      Skin: Lesion on wrist and calf noted. Bruising observed on arm.            Results  Labs   - Urine test: 2+ leukocytes, 2+ blood           Assessment and Plan   Diagnoses and all orders for this visit:    1. Encounter for annual wellness visit (Primary)    2. Senility  -     Ambulatory Referral to Home Hospice    3. Depressive disorder    4. Mixed hyperlipidemia    5. Primary hypertension    6. Recurrent UTI  -     POC Urinalysis Dipstick, Automated  -     Urine Culture - Urine, Urine, Clean Catch  -     Ambulatory Referral to Home Hospice    7. Rectal bleeding  -     pioglitazone (ACTOS) 30 MG tablet; Take 1 tablet by mouth Daily.  Dispense: 30 tablet; Refill: 1    Other orders  -     tiZANidine (ZANAFLEX) 4 MG tablet; Take 0.5 tablets by mouth Every Night.  Dispense: 30 tablet; Refill: 1  -     QUEtiapine (SEROquel) 50 MG " tablet; Take 0.5 tablets by mouth every night at bedtime.  Dispense: 30 tablet; Refill: 0          1. Potential bladder infection.  - Urinalysis shows 2+ leukocytes and 2+ blood.  - A urine culture will be conducted to confirm the presence of an infection.  - Discussed the findings and the plan to culture the urine.  - Awaiting urine culture results to guide further treatment.    2. Dysphagia.  - She has difficulty swallowing her medications.  - Diovan, Seroquel, Pravachol, Protonix 40 mg (enteric-coated), oxybutynin, and hydroxyzine (Vistaril) 20 mg capsule can be crushed or opened and mixed with food like applesauce or yogurt to facilitate ingestion.  - Discussed the plan to crush medications and mix with food.  - Caregiver advised to crush medications and mix with food to aid ingestion.    3. Sleep disturbances.  - She has been wandering at night.  - Seroquel 25 mg will be prescribed to be taken orally at bedtime every night.  - If tolerated, the dosage can be increased to 50 mg.  - Caregiver advised to start with half a tablet and increase to a whole tablet if needed.    4. Weight loss.  - She has lost 13 pounds in a month due to decreased appetite.  - It is advised to continue offering her protein drinks and foods she likes, such as fish.  - Discussed the importance of maintaining nutrition and offering preferred foods.  - Caregiver advised to continue providing protein drinks and preferred foods to encourage eating.    5. Elevated blood glucose levels.  - Her blood sugar has been running high, around 197 or higher.  - Metformin will be continued to manage her blood sugar levels.  - Discussed the current blood sugar levels and management plan.  - Caregiver advised to continue monitoring blood sugar and administering Metformin as prescribed.    6. Skin lesions.  - She has a lesion on her wrist that she keeps picking at.  - It is recommended to wrap it up and tape it to prevent her from picking at it.  -  Discussed the need to prevent picking at the lesion to promote healing.  - Caregiver advised to wrap and tape the lesion to prevent further picking.         Follow Up   Return in about 6 months (around 10/28/2025).  Patient was given instructions and counseling regarding her condition or for health maintenance advice. Please see specific information pulled into the AVS if appropriate.  Patient or patient representative verbalized consent for the use of Ambient Listening during the visit with  Cale Pearson MD for chart documentation. 4/28/2025  16:20 EDT

## 2025-05-01 ENCOUNTER — RESULTS FOLLOW-UP (OUTPATIENT)
Dept: FAMILY MEDICINE CLINIC | Facility: CLINIC | Age: 85
End: 2025-05-01
Payer: MEDICARE

## 2025-05-01 LAB
BACTERIA UR CULT: ABNORMAL
BACTERIA UR CULT: ABNORMAL

## 2025-05-01 RX ORDER — TERBINAFINE HYDROCHLORIDE 250 MG/1
250 TABLET ORAL DAILY
Qty: 5 TABLET | Refills: 0 | Status: SHIPPED | OUTPATIENT
Start: 2025-05-01

## 2025-05-01 NOTE — TELEPHONE ENCOUNTER
LM on  to call back.     HUB to relay message from Dr. Pearson.     Jennyfer, your urine cultures grew a yeast infection. If having symptoms I prescribe Lamisil to be taken 5 days         Name: DHRUV WILSON    Relationship: Emergency Contact    Best Callback Number: [phone]     HUB PROVIDED THE RELAY MESSAGE FROM THE OFFICE   PATIENT HAS FURTHER QUESTIONS AND WOULD LIKE A CALL BACK    ADDITIONAL INFORMATION:

## 2025-05-01 NOTE — TELEPHONE ENCOUNTER
LM on  to call back.    HUB to relay message from Dr. Pearson.    Jennyfer, your urine cultures grew a yeast infection. If having symptoms I prescribe Lamisil to be taken 5 days

## 2025-05-19 ENCOUNTER — TELEPHONE (OUTPATIENT)
Dept: FAMILY MEDICINE CLINIC | Facility: CLINIC | Age: 85
End: 2025-05-19
Payer: MEDICARE

## 2025-05-19 NOTE — TELEPHONE ENCOUNTER
PAPER WORK RECEIVED   BLANK SCANNED IN ON 5/19/2025  FAX [phone]  PATIENT TO : YES   PROVIDER GIVEN TO dr. Cale velazquez

## 2025-05-27 ENCOUNTER — TELEPHONE (OUTPATIENT)
Dept: FAMILY MEDICINE CLINIC | Facility: CLINIC | Age: 85
End: 2025-05-27

## 2025-05-27 NOTE — TELEPHONE ENCOUNTER
Please be informed that patient has passed. Patient has been marked  in the system. The date of death is: 25 AT 11:58PM    Caller: LUIS ALFREDO    Relationship: Other    Best call back number: [phone]     Did the patient have surgery within 30 days of their passing (Y/N): N